# Patient Record
Sex: MALE | Race: WHITE | NOT HISPANIC OR LATINO | Employment: FULL TIME | URBAN - METROPOLITAN AREA
[De-identification: names, ages, dates, MRNs, and addresses within clinical notes are randomized per-mention and may not be internally consistent; named-entity substitution may affect disease eponyms.]

---

## 2017-01-25 ENCOUNTER — OFFICE VISIT (OUTPATIENT)
Dept: URGENT CARE | Facility: PHYSICIAN GROUP | Age: 27
End: 2017-01-25
Payer: COMMERCIAL

## 2017-01-25 VITALS
OXYGEN SATURATION: 95 % | DIASTOLIC BLOOD PRESSURE: 80 MMHG | RESPIRATION RATE: 14 BRPM | HEART RATE: 84 BPM | SYSTOLIC BLOOD PRESSURE: 124 MMHG | HEIGHT: 73 IN | BODY MASS INDEX: 23.86 KG/M2 | TEMPERATURE: 97.7 F | WEIGHT: 180 LBS

## 2017-01-25 DIAGNOSIS — J06.9 URI WITH COUGH AND CONGESTION: ICD-10-CM

## 2017-01-25 DIAGNOSIS — J40 BRONCHITIS: ICD-10-CM

## 2017-01-25 PROCEDURE — 99204 OFFICE O/P NEW MOD 45 MIN: CPT | Performed by: NURSE PRACTITIONER

## 2017-01-25 RX ORDER — DEXTROAMPHETAMINE SACCHARATE, AMPHETAMINE ASPARTATE MONOHYDRATE, DEXTROAMPHETAMINE SULFATE AND AMPHETAMINE SULFATE 2.5; 2.5; 2.5; 2.5 MG/1; MG/1; MG/1; MG/1
10 CAPSULE, EXTENDED RELEASE ORAL EVERY MORNING
COMMUNITY
End: 2017-10-17

## 2017-01-25 RX ORDER — AZITHROMYCIN 250 MG/1
TABLET, FILM COATED ORAL
Qty: 6 TAB | Refills: 0 | Status: SHIPPED | OUTPATIENT
Start: 2017-01-25 | End: 2017-10-17

## 2017-01-25 ASSESSMENT — ENCOUNTER SYMPTOMS
FEVER: 0
VOMITING: 0
WEAKNESS: 0
EYE REDNESS: 0
ABDOMINAL PAIN: 0
COUGH: 1
HEADACHES: 0
EYE DISCHARGE: 0
CONSTIPATION: 0
SHORTNESS OF BREATH: 0
CHILLS: 0
SPUTUM PRODUCTION: 1
DIARRHEA: 0
PALPITATIONS: 0
SORE THROAT: 0
NAUSEA: 0
MYALGIAS: 0
ORTHOPNEA: 0
WHEEZING: 0
BACK PAIN: 0

## 2017-01-25 NOTE — PROGRESS NOTES
"Subjective:      Tomas Pozo is a 26 y.o. male who presents with Cough            Cough  Pertinent negatives include no chest pain, chills, ear pain, eye redness, fever, headaches, myalgias, sore throat, shortness of breath or wheezing. There is no history of environmental allergies.   Tomas is a 26 year old male who is here for cough x 3 days. Moved here from [place] to work with [employer]. States \"everyone at work has pneumonia\" and was told by his boss to get looked at. Denies SOB, chest tightness, asthma. Taking Dayquil. Denies nasal congestion/facial pressure but has runny nose, denies sore throat. Drinks \"a gallon of water per day\". States has a yellow/green sputum with cough.    PMH:  has no past medical history on file.  MEDS:   Current outpatient prescriptions:   •  amphetamine-dextroamphetamine XR (ADDERALL XR) 10 MG CAPSULE SR 24 HR, Take 10 mg by mouth every morning., Disp: , Rfl:   •  azithromycin (ZITHROMAX) 250 MG Tab, Take 2 tabs by mouth on day 1, then take 1 tab on days 2-5, Disp: 6 Tab, Rfl: 0  ALLERGIES: No Known Allergies  SURGHX: History reviewed. No pertinent past surgical history.  SOCHX:    FH: Family history was reviewed, no pertinent findings to report      Review of Systems   Constitutional: Positive for malaise/fatigue. Negative for fever and chills.   HENT: Positive for congestion. Negative for ear pain and sore throat.    Eyes: Negative for discharge and redness.   Respiratory: Positive for cough and sputum production. Negative for shortness of breath and wheezing.    Cardiovascular: Negative for chest pain, palpitations and orthopnea.   Gastrointestinal: Negative for nausea, vomiting, abdominal pain, diarrhea and constipation.   Musculoskeletal: Negative for myalgias and back pain.   Neurological: Negative for weakness and headaches.   Endo/Heme/Allergies: Negative for environmental allergies.          Objective:     /80 mmHg  Pulse 84  Temp(Src) 36.5 °C (97.7 °F)  Resp 14  " "Ht 1.854 m (6' 1\")  Wt 81.647 kg (180 lb)  BMI 23.75 kg/m2  SpO2 95%     Physical Exam   Constitutional: He is oriented to person, place, and time. He appears well-developed and well-nourished. No distress.   HENT:   Head: Normocephalic.   Right Ear: Hearing, external ear and ear canal normal. Tympanic membrane is bulging. A middle ear effusion is present.   Left Ear: Hearing, external ear and ear canal normal. Tympanic membrane is bulging. A middle ear effusion is present.   Nose: Mucosal edema and rhinorrhea present. No sinus tenderness.   Mouth/Throat: Uvula is midline, oropharynx is clear and moist and mucous membranes are normal.   Eyes: Conjunctivae and EOM are normal. Pupils are equal, round, and reactive to light.   Neck: Normal range of motion. Neck supple.   Cardiovascular: Normal rate and regular rhythm.    Pulmonary/Chest: Effort normal. No accessory muscle usage. No respiratory distress. He has no decreased breath sounds. He has no wheezes. He has no rhonchi. He has no rales.   Musculoskeletal: Normal range of motion.   Lymphadenopathy:     He has no cervical adenopathy.   Neurological: He is alert and oriented to person, place, and time.   Skin: Skin is warm and dry. He is not diaphoretic.   Vitals reviewed.              Assessment/Plan:     1. URI with cough and congestion    - azithromycin (ZITHROMAX) 250 MG Tab; Take 2 tabs by mouth on day 1, then take 1 tab on days 2-5  Dispense: 6 Tab; Refill: 0    2. Bronchitis    - azithromycin (ZITHROMAX) 250 MG Tab; Take 2 tabs by mouth on day 1, then take 1 tab on days 2-5  Dispense: 6 Tab; Refill: 0      Increase water intake  May use Ibuprofen/Tylenol prn for any fever, body aches or throat pain  May take long acting antihistamine for seasonal allergy symptoms prn  May use saline nasal spray/navneet pot for nasal congestion prn  May use Nasacort prn for nasal congestion  May use throat lozenges for throat discomfort  May gargle with salt water prn for " throat discomfort  May drink smoothies for nutrition if too painful to swallow solid foods  Monitor for continued fever with treatment, any sinus pain/pressure with sinus congestion with thick mucus production and HA- need re-evaluation  Monitor for productive cough, SOB and chest pain/tightness, fever- need re-evaluation

## 2017-01-25 NOTE — MR AVS SNAPSHOT
"        Tomas Lemak   2017 2:45 PM   Office Visit   MRN: [id number]    Department:  [facility]   Dept Phone:  [phone]    Description:  Male : 1990   Provider:  LEENA Rush           Reason for Visit     Cough Cough, x 3 days.      Allergies as of 2017     No Known Allergies      You were diagnosed with     Bronchitis   [525024]         Vital Signs     Blood Pressure Pulse Temperature Respirations Height Weight    124/80 mmHg 84 36.5 °C (97.7 °F) 14 1.854 m (6' 1\") 81.647 kg (180 lb)    Body Mass Index Oxygen Saturation                23.75 kg/m2 95%          Basic Information     Date Of Birth Sex Race Ethnicity Preferred Language    1990 Male White Non- English      Health Maintenance     Patient has no pending health maintenance at this time      Current Immunizations     No immunizations on file.      Below and/or attached are the medications your provider expects you to take. Review all of your home medications and newly ordered medications with your provider and/or pharmacist. Follow medication instructions as directed by your provider and/or pharmacist. Please keep your medication list with you and share with your provider. Update the information when medications are discontinued, doses are changed, or new medications (including over-the-counter products) are added; and carry medication information at all times in the event of emergency situations     Allergies:  No Known Allergies          Medications  Valid as of: 2017 -  3:19 PM    Generic Name Brand Name Tablet Size Instructions for use    Amphetamine-Dextroamphetamine (CAPSULE SR 24 HR) ADDERALL XR 10 MG Take 10 mg by mouth every morning.        Azithromycin (Tab) ZITHROMAX 250 MG Take 2 tabs by mouth on day 1, then take 1 tab on days 2-5        .                 Medicines prescribed today were sent to:     [facility] - [place], [place] - [address]    [address] [place] [address]  "    Phone: [phone] Fax: [phone]    Open 24 Hours?: No      Medication refill instructions:       If your prescription bottle indicates you have medication refills left, it is not necessary to call your provider’s office. Please contact your pharmacy and they will refill your medication.    If your prescription bottle indicates you do not have any refills left, you may request refills at any time through one of the following ways: The online [facility]vd system (except Urgent Care), by calling your provider’s office, or by asking your pharmacy to contact your provider’s office with a refill request. Medication refills are processed only during regular business hours and may not be available until the next business day. Your provider may request additional information or to have a follow-up visit with you prior to refilling your medication.   *Please Note: Medication refills are assigned a new Rx number when refilled electronically. Your pharmacy may indicate that no refills were authorized even though a new prescription for the same medication is available at the pharmacy. Please request the medicine by name with the pharmacy before contacting your provider for a refill.           CrushBlvd Access Code: [email or web address]  Expires: 2/24/2017  3:19 PM    Your email address is not on file at [facility].  Email Addresses are required for you to sign up for [facility], please contact [phone] to verify your personal information and to provide your email address prior to attempting to register for [facility].    Tomas Pozo  [address] [place] [address]    [facility]ushBlvd  A secure, online tool to manage your health information     [facility]’s CrushBlvd® is a secure, online tool that connects you to your personalized health information from the privacy of your home -- day or night - making it very easy for you to manage your healthcare. Once the activation process is completed, you can even access your medical  information using the MeritBuilder andrzej, which is available for free in the Apple Andrzej store or Google Play store.     To learn more about MeritBuilder, visit [email or web address]    There are two levels of access available (as shown below):   My Chart Features  [facility] Doctor [facility]  Specialists [facility]  Urgent  Care Non-[facility] Primary Care Doctor   Email your healthcare team securely and privately 24/7 X X X    Manage appointments: schedule your next appointment; view details of past/upcoming appointments X      Request prescription refills. X      View recent personal medical records, including lab and immunizations X X X X   View health record, including health history, allergies, medications X X X X   Read reports about your outpatient visits, procedures, consult and ER notes X X X X   See your discharge summary, which is a recap of your hospital and/or ER visit that includes your diagnosis, lab results, and care plan X X  X     How to register for MeritBuilder:  Once your e-mail address has been verified, follow the following steps to sign up for MeritBuilder.     1. Go to  [email or web address]  2. Click on the Sign Up Now box, which takes you to the New Member Sign Up page. You will need to provide the following information:  a. Enter your MeritBuilder Access Code exactly as it appears at the top of this page. (You will not need to use this code after you’ve completed the sign-up process. If you do not sign up before the expiration date, you must request a new code.)   b. Enter your date of birth.   c. Enter your home email address.   d. Click Submit, and follow the next screen’s instructions.  3. Create a MeritBuilder ID. This will be your MeritBuilder login ID and cannot be changed, so think of one that is secure and easy to remember.  4. Create a MeritBuilder password. You can change your password at any time.  5. Enter your Password Reset Question and Answer. This can be used at a later time if you forget your password.    6. Enter your e-mail address. This allows you to receive e-mail notifications when new information is available in [employer].  7. Click Sign Up. You can now view your health information.    For assistance activating your [employer] account, call [phone]

## 2017-02-08 ENCOUNTER — OFFICE VISIT (OUTPATIENT)
Dept: MEDICAL GROUP | Facility: PHYSICIAN GROUP | Age: 27
End: 2017-02-08
Payer: COMMERCIAL

## 2017-02-08 VITALS
SYSTOLIC BLOOD PRESSURE: 110 MMHG | TEMPERATURE: 97.9 F | HEIGHT: 73 IN | OXYGEN SATURATION: 97 % | WEIGHT: 183 LBS | BODY MASS INDEX: 24.25 KG/M2 | HEART RATE: 93 BPM | DIASTOLIC BLOOD PRESSURE: 78 MMHG | RESPIRATION RATE: 14 BRPM

## 2017-02-08 DIAGNOSIS — F90.0 ATTENTION DEFICIT HYPERACTIVITY DISORDER (ADHD), PREDOMINANTLY INATTENTIVE TYPE: ICD-10-CM

## 2017-02-08 DIAGNOSIS — F51.01 PRIMARY INSOMNIA: ICD-10-CM

## 2017-02-08 DIAGNOSIS — F13.20 SEDATIVE, HYPNOTIC OR ANXIOLYTIC DEPENDENCE, UNSPECIFIED: ICD-10-CM

## 2017-02-08 DIAGNOSIS — R07.9 CHEST PAIN AT REST: ICD-10-CM

## 2017-02-08 PROCEDURE — 99204 OFFICE O/P NEW MOD 45 MIN: CPT | Performed by: NURSE PRACTITIONER

## 2017-02-08 RX ORDER — DEXTROAMPHETAMINE SACCHARATE, AMPHETAMINE ASPARTATE, DEXTROAMPHETAMINE SULFATE AND AMPHETAMINE SULFATE 5; 5; 5; 5 MG/1; MG/1; MG/1; MG/1
20 TABLET ORAL 2 TIMES DAILY
Qty: 60 TAB | Refills: 0 | Status: SHIPPED | OUTPATIENT
Start: 2017-02-08 | End: 2017-10-17

## 2017-02-08 RX ORDER — DEXTROAMPHETAMINE SACCHARATE, AMPHETAMINE ASPARTATE, DEXTROAMPHETAMINE SULFATE AND AMPHETAMINE SULFATE 5; 5; 5; 5 MG/1; MG/1; MG/1; MG/1
20 TABLET ORAL 2 TIMES DAILY
COMMUNITY
End: 2017-02-08 | Stop reason: SDUPTHER

## 2017-02-08 RX ORDER — ZOLPIDEM TARTRATE 5 MG/1
5 TABLET ORAL NIGHTLY PRN
COMMUNITY
End: 2017-02-08 | Stop reason: SDUPTHER

## 2017-02-08 RX ORDER — ZOLPIDEM TARTRATE 5 MG/1
5 TABLET ORAL NIGHTLY PRN
Qty: 30 TAB | Refills: 0 | Status: SHIPPED | OUTPATIENT
Start: 2017-02-08 | End: 2017-04-25 | Stop reason: SDUPTHER

## 2017-02-08 RX ORDER — DEXTROAMPHETAMINE SACCHARATE, AMPHETAMINE ASPARTATE, DEXTROAMPHETAMINE SULFATE AND AMPHETAMINE SULFATE 5; 5; 5; 5 MG/1; MG/1; MG/1; MG/1
20 TABLET ORAL 2 TIMES DAILY
Qty: 60 TAB | Refills: 0 | Status: SHIPPED | OUTPATIENT
Start: 2017-02-08 | End: 2017-02-08 | Stop reason: SDUPTHER

## 2017-02-08 ASSESSMENT — PATIENT HEALTH QUESTIONNAIRE - PHQ9: CLINICAL INTERPRETATION OF PHQ2 SCORE: 0

## 2017-02-08 NOTE — Clinical Note
February 8, 2017     Tomas Pozo  [address] [facility] [address]      Dear Tomas:    Thank you for enrolling in [employer] [place]. Please follow the instructions below to securely access your online medical record. [facility] allows you to send messages to your healthcare team, view certain test results, renew your prescriptions, schedule appointments, and more.     How Do I Sign Up?  1. In your Internet browser, go to  [email or web address]  2. Click on the Sign Up Now link in the Sign In box. You will see the New Member Sign Up page.  3. Enter your [employer] [place] Access Code exactly as it appears below (case sensitive). You will not need to use this code after you’ve completed the sign-up process. If you do not sign up before the expiration date, you must request a new code.  [employer] [place] Access Code: [email or web address]  Expires: 2/24/2017  3:19 PM    4. Enter your Email address and Date of Birth (mm/dd/yyyy) as indicated and click Submit. You will be taken to the next sign-up page.  5. Create a [employer] [place] ID (case sensitive) . This will be your [employer] [place] login ID and cannot be changed, so think of one that is secure and easy to remember.  6. Create a [employer] [place] password  (case sensitive).   · Your password must be a length of at least 6 characters/digits.  · It must include at least 1 numeric.  · You can change your password at any time.  7. Enter your Password Reset Question and Answer. This can be used at a later time if you forget your password.   8. Enter your e-mail address. You will receive e-mail notification when new information is available in [employer] [place].  9. Click Sign Up. You can now view your medical record.     Additional Information  Please contact [employer] [place] Customer Support at [phone] for any questions . Remember, [facility] [place] is NOT to be used for urgent needs. For medical emergencies, dial 911.          Introducing [employer] [place]    [facility]’s Secure, Online Health Connection      [facility] now offers  convenient, online access to your healthcare team and personal health information.  [employer] makes managing your healthcare easier than ever, and allows you to:  • Email your healthcare provider securely and privately  • Access your test results  • Request prescription refills 24 hours a day  • View your personal medical records from home  • Schedule or change your appointments  • View your Insurance and Billing Information  • Pay bills online ? Coming soon!        Sign below to get started:  I hereby request access to Worklight application.  I agree to abide by the [employer] Terms and Conditions, which will be provided to me upon activating my account.       __________________________________        _________________________  Name (Please Print)          Date of Birth     __________________________________       _________________________  Signature          Primary Care Provider      _______________  Date                          *For Internal Use Only: Please scan this form into the patient’s chart. Click on  - Select Patient - Attach to Encounter:  - Document Type: Consent   - Document Description: MyChart Consent

## 2017-02-08 NOTE — PROGRESS NOTES
Tomas Pozo is a 26 y.o. 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  [place] [facility].  Just moved here from [place].  Loves to snowboard  Attention deficit hyperactivity disorder (ADHD), predominantly inattentive type  Patient has been taking Adderall 10-20 mg daily since college. States his symptoms are mostly inattentive. Patient states that he takes between 10-15 mg in the morning and perhaps 5-10 mg at night as needed. States that he usually does not take the albuterol on weekends. Patient recently moved here from [place] and is an  at [employer]. Discussed urine drug screen and contract for continuous use of Adderall..    Chest pain at rest  Patient states that when he was in college he noticed his first episode of chest pain after lifting heavy weights. He then has noticed over the past ensuing years occasional episodes of chest pain with occasional palpitations. Patient states that this may be due to a prior stressful job in [place]. Has not noticed this chest pain occurring frequently since he moved to [place] and change [email or web address]. Denies shortness of breath diaphoresis edema, radiating pain into arm or neck. Patient states that these episodes resolve within 15 minutes    Primary insomnia  Patient states that he has been taking Ambien when necessary for the last 2-3 years. States that he may take one Ambien max 2 nights per week. Patient states that his work often keeps him up late and then he has difficulty the following night getting to sleep early enough. States that if he doesn't get four   hours of sleep he notices some grogginess with the Ambien. Denies unusual dreams or sleepwalking..      Current medicines (including changes today)  Current Outpatient Prescriptions   Medication Sig Dispense Refill   • zolpidem (AMBIEN) 5 MG Tab Take 1 Tab by mouth at bedtime as needed for Sleep. 30 Tab 0   •  "amphetamine-dextroamphetamine (ADDERALL, 20MG,) 20 MG Tab Take 1 Tab by mouth 2 times a day. 60 Tab 0   • amphetamine-dextroamphetamine XR (ADDERALL XR) 10 MG CAPSULE SR 24 HR Take 10 mg by mouth every morning.     • azithromycin (ZITHROMAX) 250 MG Tab Take 2 tabs by mouth on day 1, then take 1 tab on days 2-5 6 Tab 0     No current facility-administered medications for this visit.       He  has no past medical history on file.    He  has past surgical history that includes thyroglossal duct excision and open reduction.    Social History   Substance Use Topics   • Smoking status: Former Smoker -- 0.25 packs/day     Types: Cigarettes     Quit date: 02/08/2013   • Smokeless tobacco: Former User     Quit date: 02/08/2014   • Alcohol Use: 1.8 oz/week     3 Cans of beer per week      Comment: weekends       Social History     Social History Narrative       Family History   Problem Relation Age of Onset   • No Known Problems Mother    • Hypertension Father    • Psychiatry Sister        Family Status   Relation Status Death Age   • Mother Alive    • Father Alive    • Sister Alive    • Brother Alive    • Sister Alive          ROS  As stated in HPI.  All other systems reviewed and are negative     Objective:     Blood pressure 110/78, pulse 93, temperature 36.6 °C (97.9 °F), resp. rate 14, height 1.854 m (6' 1\"), weight 83.008 kg (183 lb), SpO2 97 %. Body mass index is 24.15 kg/(m^2).  Physical Exam:    Constitutional: Alert, no distress.  Skin: Warm, dry, good turgor, no rashes in visible areas.  Eye: Equal, round and reactive, conjunctiva clear, lids normal.  ENMT: Lips without lesions, good dentition, oropharynx clear.  Neck: Trachea midline, no masses, no thyromegaly. No cervical or supraclavicular lymphadenopathy.  Respiratory: Unlabored respiratory effort, lungs clear to auscultation, no wheezes, no ronchi.  Cardiovascular: Normal S1, S2, no murmur, no edema.  Abdomen: Soft, non-tender, no masses, no " hepatosplenomegaly.  Psych: Alert and oriented x3, normal affect and mood.        Assessment and Plan:   The following treatment plan was discussed    1. Chest pain at rest  This is a new problem to me. Chronic. Stable. Patient will keep a journal of symptoms and frequency of his chest pain. We discussed the possibility that this may be a side effect that is occurring from the Adderall. We will draw labs to rule out metabolic issues. We'll monitor and follow.  - COMP METABOLIC PANEL; Future  - LIPID PROFILE; Future    2. Attention deficit hyperactivity disorder (ADHD), predominantly inattentive type  This is a new problem to me. Patient has been on Adderall 20 mg for several years. Blood pressure 110/78 today. Pulse 93. Denies chest pain at this time. Discussed controlled substance agreement. Patient reviewed and signed agreement and we did the urine drug screen today. We'll monitor and follow results.  - Controlled Substance Treatment Agreement    3. Primary insomnia  This is a new problem to me. Chronic. Stable. Patient to continue with Ambien 5 mg tablets when necessary. Discussed sleep hygiene. We'll monitor and follow.      Records requested.  Followup: No Follow-up on file.

## 2017-02-08 NOTE — ASSESSMENT & PLAN NOTE
Patient has been taking Adderall 10-20 mg daily since college. States his symptoms are mostly inattentive. Patient states that he takes between 10-15 mg in the morning and perhaps 5-10 mg at night as needed. States that he usually does not take the albuterol on weekends. Patient recently moved here from [place] and is an  at [employer]. Discussed urine drug screen and contract for continuous use of Adderall..

## 2017-02-08 NOTE — ASSESSMENT & PLAN NOTE
Patient states that when he was in college he noticed his first episode of chest pain after lifting heavy weights. He then has noticed over the past ensuing years occasional episodes of chest pain with occasional palpitations. Patient states that this may be due to a prior stressful job in [place]. Has not noticed this chest pain occurring frequently since he moved to [place] and change drops. Denies shortness of breath diaphoresis edema, radiating pain into arm or neck. Patient states that these episodes resolve within 15 minutes

## 2017-02-08 NOTE — ASSESSMENT & PLAN NOTE
Patient states that he has been taking Ambien when necessary for the last 2-3 years. States that he may take one Ambien max 2 nights per week. Patient states that his work often keeps him up late and then he has difficulty the following night getting to sleep early enough. States that if he doesn't get four   hours of sleep he notices some grogginess with the Ambien. Denies unusual dreams or sleepwalking..

## 2017-04-25 RX ORDER — ZOLPIDEM TARTRATE 5 MG/1
5 TABLET ORAL NIGHTLY PRN
Qty: 30 TAB | Refills: 0 | Status: SHIPPED
Start: 2017-04-25 | End: 2017-06-23 | Stop reason: SDUPTHER

## 2017-04-25 NOTE — TELEPHONE ENCOUNTER
Was the patient seen in the last year in this department? Yes     Does patient have an active prescription for medications requested? No     Received Request Via: Pharmacy

## 2017-05-04 ENCOUNTER — OFFICE VISIT (OUTPATIENT)
Dept: MEDICAL GROUP | Facility: PHYSICIAN GROUP | Age: 27
End: 2017-05-04
Payer: COMMERCIAL

## 2017-05-04 VITALS
RESPIRATION RATE: 14 BRPM | WEIGHT: 181 LBS | BODY MASS INDEX: 23.99 KG/M2 | DIASTOLIC BLOOD PRESSURE: 80 MMHG | OXYGEN SATURATION: 93 % | SYSTOLIC BLOOD PRESSURE: 114 MMHG | HEIGHT: 73 IN | TEMPERATURE: 97.9 F | HEART RATE: 74 BPM

## 2017-05-04 DIAGNOSIS — F90.0 ATTENTION DEFICIT HYPERACTIVITY DISORDER (ADHD), PREDOMINANTLY INATTENTIVE TYPE: ICD-10-CM

## 2017-05-04 DIAGNOSIS — F51.01 PRIMARY INSOMNIA: ICD-10-CM

## 2017-05-04 DIAGNOSIS — R09.89 CHEST CONGESTION: ICD-10-CM

## 2017-05-04 DIAGNOSIS — R07.9 CHEST PAIN AT REST: ICD-10-CM

## 2017-05-04 PROCEDURE — 99213 OFFICE O/P EST LOW 20 MIN: CPT | Performed by: NURSE PRACTITIONER

## 2017-05-04 NOTE — ASSESSMENT & PLAN NOTE
Patient describes the onset of cold and chest congestion 5 days ago. He states that he had aches and pains along with chest tightness and congestion with ensuing cough. He states he has been resting taking fluids taking ibuprofen and one after 7 with some improvement of his symptoms. States that yesterday was the first day he did not have a low-grade fever. Patient denies any chest pain, bloody cough facial pain or sinus pressure.

## 2017-05-04 NOTE — MR AVS SNAPSHOT
"        Tomas Nohelia   2017 7:20 AM   Office Visit   MRN: [id number]    Department:  [facility]   Dept Phone:  [phone]    Description:  Male : 1990   Provider:  JESSE Lockett           Reason for Visit     Cold Symptoms x's 1 week       Allergies as of 2017     No Known Allergies      Vital Signs     Blood Pressure Pulse Temperature Respirations Height Weight    114/80 mmHg 74 36.6 °C (97.9 °F) 14 1.854 m (6' 0.99\") 82.101 kg (181 lb)    Body Mass Index Oxygen Saturation Smoking Status             23.89 kg/m2 93% Former Smoker         Basic Information     Date Of Birth Sex Race Ethnicity Preferred Language    1990 Male White Non- English      Problem List              ICD-10-CM Priority Class Noted - Resolved    Chest pain at rest R07.9   2017 - Present    Attention deficit hyperactivity disorder (ADHD), predominantly inattentive type F90.0   2017 - Present    Primary insomnia F51.01   2017 - Present      Health Maintenance        Date Due Completion Dates    IMM HEP B VACCINE (1 of 3 - Primary Series) 1989 ---    IMM HEP A VACCINE (1 of 2 - Standard Series) 1990 ---    IMM VARICELLA (CHICKENPOX) VACCINE (1 of 2 - 2 Dose Adolescent Series) 2002 ---    IMM DTaP/Tdap/Td Vaccine (1 - Tdap) 2008 ---            Current Immunizations     No immunizations on file.      Below and/or attached are the medications your provider expects you to take. Review all of your home medications and newly ordered medications with your provider and/or pharmacist. Follow medication instructions as directed by your provider and/or pharmacist. Please keep your medication list with you and share with your provider. Update the information when medications are discontinued, doses are changed, or new medications (including over-the-counter products) are added; and carry medication information at all times in the event of emergency situations     Allergies:  No Known " Allergies          Medications  Valid as of: May 04, 2017 -  7:47 AM    Generic Name Brand Name Tablet Size Instructions for use    Amphetamine-Dextroamphetamine (CAPSULE SR 24 HR) ADDERALL XR 10 MG Take 10 mg by mouth every morning.        Amphetamine-Dextroamphetamine (Tab) ADDERALL 20 MG Take 1 Tab by mouth 2 times a day.        Azithromycin (Tab) ZITHROMAX 250 MG Take 2 tabs by mouth on day 1, then take 1 tab on days 2-5        Zolpidem Tartrate (Tab) AMBIEN 5 MG Take 1 Tab by mouth at bedtime as needed for Sleep.        .                 Medicines prescribed today were sent to:     [facility] - [place], [place] - [address]    [address] [place] [address]    Phone: [phone] Fax: [phone]    Open 24 Hours?: No      Medication refill instructions:       If your prescription bottle indicates you have medication refills left, it is not necessary to call your provider’s office. Please contact your pharmacy and they will refill your medication.    If your prescription bottle indicates you do not have any refills left, you may request refills at any time through one of the following ways: The online [employer] system (except Urgent Care), by calling your provider’s office, or by asking your pharmacy to contact your provider’s office with a refill request. Medication refills are processed only during regular business hours and may not be available until the next business day. Your provider may request additional information or to have a follow-up visit with you prior to refilling your medication.   *Please Note: Medication refills are assigned a new Rx number when refilled electronically. Your pharmacy may indicate that no refills were authorized even though a new prescription for the same medication is available at the pharmacy. Please request the medicine by name with the pharmacy before contacting your provider for a refill.           [employer] Access Code: [email or web address]  Expires: 6/3/2017  7:47  AM    [employer]  A secure, online tool to manage your health information     [employer]® is a secure, online tool that connects you to your personalized health information from the privacy of your home -- day or night - making it very easy for you to manage your healthcare. Once the activation process is completed, you can even access your medical information using the [employer] andrzej, which is available for free in the Apple Andrzej store or Google Play store.     [employer] provides the following levels of access (as shown below):   My Chart Features   [facility] Primary [facility] Doctor [facility]  Specialists [facility]  Urgent  Care [facility]  Primary Care  Doctor   Email your healthcare team securely and privately 24/7 X X X    Manage appointments: schedule your next appointment; view details of past/upcoming appointments X      Request prescription refills. X      View recent personal medical records, including lab and immunizations X X X X   View health record, including health history, allergies, medications X X X X   Read reports about your outpatient visits, procedures, consult and ER notes X X X X   See your discharge summary, which is a recap of your hospital and/or ER visit that includes your diagnosis, lab results, and care plan. X X       How to register for [employer]:  1. Go to  [email or web address].  2. Click on the Sign Up Now box, which takes you to the New Member Sign Up page. You will need to provide the following information:  a. Enter your [employer] Access Code exactly as it appears at the top of this page. (You will not need to use this code after you’ve completed the sign-up process. If you do not sign up before the expiration date, you must request a new code.)   b. Enter your date of birth.   c. Enter your home email address.   d. Click Submit, and follow the next screen’s instructions.  3. Create a [employer] ID. This will be your [employer] login ID and cannot be changed, so think of one that is secure and  easy to remember.  4. Create a [employer] password. You can change your password at any time.  5. Enter your Password Reset Question and Answer. This can be used at a later time if you forget your password.   6. Enter your e-mail address. This allows you to receive e-mail notifications when new information is available in [employer].  7. Click Sign Up. You can now view your health information.    For assistance activating your [employer] account, call [phone]

## 2017-05-04 NOTE — PROGRESS NOTES
Chief Complaint   Patient presents with   • Cold Symptoms     x's 1 week        Subjective:   Tomas Pozo is a 26 y.o. male here today for evaluation and management of:    Chest pain at rest  Patient states that he has not had any episodes of chest pain since we last visited 3 months ago. He feels that his job is more relaxed and he is enjoying it and learning. He also feels that the moved from [place] was scheduled for him and this could've been contributing to his chest pain.    Primary insomnia  Patient states that he is occasionally taking an Ambien maybe once a week. He is really trying to work on getting to bed earlier around 10:00 rather than midnight or 1:00 to try and improve his sleep hygiene.    Attention deficit hyperactivity disorder (ADHD), predominantly inattentive type  Patient states that his focus is very good on his current Adderall dosage. He does take it daily, on weekends he cuts it in half and finds that this is working for him. Denies palpitations or headaches. Weight is stable. He is exercising daily.    Chest congestion  Patient describes the onset of cold and chest congestion 5 days ago. He states that he had aches and pains along with chest tightness and congestion with ensuing cough. He states he has been resting taking fluids taking ibuprofen and one after 7 with some improvement of his symptoms. States that yesterday was the first day he did not have a low-grade fever. Patient denies any chest pain, bloody cough facial pain or sinus pressure.         Current medicines (including changes today)  Current Outpatient Prescriptions   Medication Sig Dispense Refill   • amphetamine-dextroamphetamine (ADDERALL, 20MG,) 20 MG Tab Take 1 Tab by mouth 2 times a day. 60 Tab 0   • zolpidem (AMBIEN) 5 MG Tab Take 1 Tab by mouth at bedtime as needed for Sleep. 30 Tab 0   • amphetamine-dextroamphetamine XR (ADDERALL XR) 10 MG CAPSULE SR 24 HR Take 10 mg by mouth every morning.     • azithromycin  "(ZITHROMAX) 250 MG Tab Take 2 tabs by mouth on day 1, then take 1 tab on days 2-5 6 Tab 0     No current facility-administered medications for this visit.     He  has no past medical history on file.    ROS as stated in history of present illness.  No chest pain, no shortness of breath, no abdominal pain       Objective:     Blood pressure 114/80, pulse 74, temperature 36.6 °C (97.9 °F), resp. rate 14, height 1.854 m (6' 0.99\"), weight 82.101 kg (181 lb), SpO2 93 %. Body mass index is 23.89 kg/(m^2). stable.  Physical Exam:  Constitutional: Alert, no distress.  Skin: Warm, dry, good turgor,no cyanosis, no rashes in visible areas.  Eye: Equal, round and reactive, conjunctiva clear, lids normal.  Ears: No tenderness, no discharge.  External canals are without any significant edema or erythema.  Tympanic membranes are without any inflammation, no effusion.  Gross auditory acuity is intact.  Nose: symmetrical without tenderness, no discharge. Slight erythema noted in the right nares, clear discharge.  Mouth/Throat: lips without lesion.  Oropharynx clear.  Throat with mild erythema, no exudates or tonsillar enlargement.  Neck: Trachea midline, no masses, no obvious thyroid enlargement.. No cervical or supraclavicular lymphadenopathy. Range of motion within normal limits.  Neuro: Cranial nerves 2-12 grossly intact.  No sensory deficit.  Respiratory: Unlabored respiratory effort, lungs clear to auscultation, no wheezes, no ronchi.  Cardiovascular: Normal S1, S2, no murmur, no edema..  Psych: Alert and oriented x3, normal affect and mood and judgement.        Assessment and Plan:   The following treatment plan was discussed    1. Chest pain at rest  Chronic. Stable. Patient states that this has resolved at this time.    2. Primary insomnia  Chronic. Ongoing. Patient to continue working on sleep hygiene. We discussed caffeine intake and timing of exercise. Suggested the patient tries not to exercise right before going to " bed. Continue with Ambien when necessary.    3. Attention deficit hyperactivity disorder (ADHD), predominantly inattentive type  Chronic. Stable. Continue with Adderall. Controlled substance agreement is in place. No evidence of abuse.    4. Chest congestion  This is a new problem to me. Acute. Improving. Continue with over-the-counter guameffisin, may add Delsym cough syrup at night. Over-the-counter antihistamine or decongestant as needed for symptom control. Ibuprofen 4 aches and pains. Patient to return if symptoms do not improve in the next week or he develops recurrence of fever or chills chest pain or productive cough.  PLEASE NOTE: This dictation was created using voice recognition software. I have made every reasonable attempt to correct obvious errors, but I expect that there are errors of grammar and possibly content that I did not discover before finalizing the note.      Followup: Return if symptoms worsen or fail to improve.

## 2017-05-04 NOTE — ASSESSMENT & PLAN NOTE
Patient states that he has not had any episodes of chest pain since we last visited 3 months ago. He feels that his job is more relaxed and he is enjoying it and learning. He also feels that the moved from [place] was scheduled for him and this could've been contributing to his chest pain.

## 2017-05-04 NOTE — ASSESSMENT & PLAN NOTE
Patient states that he is occasionally taking an Ambien maybe once a week. He is really trying to work on getting to bed earlier around 10:00 rather than midnight or 1:00 to try and improve his sleep hygiene.

## 2017-05-04 NOTE — Clinical Note
May 4, 2017     Tomas Pozo  [address] [address]      Dear Tomas:    Thank you for enrolling in [employer]. Please follow the instructions below to securely access your online medical record. [employer] allows you to send messages to your healthcare team, view certain test results, renew your prescriptions, schedule appointments, and more.     How Do I Sign Up?  1. In your Internet browser, go to  [email or web address]  2. Click on the Sign Up Now link in the Sign In box. You will see the New Member Sign Up page.  3. Enter your [employer] Access Code exactly as it appears below (case sensitive). You will not need to use this code after you’ve completed the sign-up process. If you do not sign up before the expiration date, you must request a new code.  [employer] Access Code: [email or web address]  Expires: 6/3/2017  7:47 AM    4. Enter your Email address and Date of Birth (mm/dd/yyyy) as indicated and click Submit. You will be taken to the next sign-up page.  5. Create a [employer] ID (case sensitive) . This will be your [employer] login ID and cannot be changed, so think of one that is secure and easy to remember.  6. Create a [employer] password  (case sensitive).   · Your password must be a length of at least 6 characters/digits.  · It must include at least 1 numeric.  · You can change your password at any time.  7. Enter your Password Reset Question and Answer. This can be used at a later time if you forget your password.   8. Enter your e-mail address. You will receive e-mail notification when new information is available in [employer].  9. Click Sign Up. You can now view your medical record.     Additional Information  Please contact [employer] Customer Support at [phone] for any questions . Remember, [employer] is NOT to be used for urgent needs. For medical emergencies, dial 911.          Introducing [employer]    [facility]’s Secure, Online Health Connection      [facility] now offers convenient,  online access to your healthcare team and personal health information.  [employer] makes managing your healthcare easier than ever, and allows you to:  • Email your healthcare provider securely and privately  • Access your test results  • Request prescription refills 24 hours a day  • View your personal medical records from home  • Schedule or change your appointments  • View your Insurance and Billing Information  • Pay bills online ? Coming soon!        Sign below to get started:  I hereby request access to fitaborate application.  I agree to abide by the [employer] Terms and Conditions, which will be provided to me upon activating my account.       __________________________________        _________________________  Name (Please Print)          Date of Birth     __________________________________       _________________________  Signature          Primary Care Provider      _______________  Date                          *For Internal Use Only: Please scan this form into the patient’s chart. Click on  - Select Patient - Attach to Encounter:  - Document Type: Consent   - Document Description: MyChart Consent

## 2017-05-04 NOTE — ASSESSMENT & PLAN NOTE
Patient states that his focus is very good on his current Adderall dosage. He does take it daily, on weekends he cuts it in half and finds that this is working for him. Denies palpitations or headaches. Weight is stable. He is exercising daily.

## 2017-06-23 RX ORDER — ZOLPIDEM TARTRATE 5 MG/1
5 TABLET ORAL NIGHTLY PRN
Qty: 30 TAB | Refills: 0 | Status: SHIPPED
Start: 2017-06-23 | End: 2017-08-17 | Stop reason: SDUPTHER

## 2017-08-17 RX ORDER — ZOLPIDEM TARTRATE 5 MG/1
5 TABLET ORAL NIGHTLY PRN
Qty: 30 TAB | Refills: 0 | Status: SHIPPED
Start: 2017-08-17 | End: 2017-10-17 | Stop reason: SDUPTHER

## 2017-08-17 NOTE — TELEPHONE ENCOUNTER
Requested Prescriptions     Signed Prescriptions Disp Refills   • zolpidem (AMBIEN) 5 MG Tab 30 Tab 0     Sig: Take 1 Tab by mouth at bedtime as needed for Sleep.     Authorizing Provider: TAMMY RAM A.P.R.N.

## 2017-10-17 ENCOUNTER — OFFICE VISIT (OUTPATIENT)
Dept: MEDICAL GROUP | Facility: PHYSICIAN GROUP | Age: 27
End: 2017-10-17
Payer: COMMERCIAL

## 2017-10-17 VITALS
TEMPERATURE: 98.1 F | HEIGHT: 73 IN | DIASTOLIC BLOOD PRESSURE: 78 MMHG | OXYGEN SATURATION: 95 % | SYSTOLIC BLOOD PRESSURE: 116 MMHG | HEART RATE: 82 BPM | BODY MASS INDEX: 24.65 KG/M2 | RESPIRATION RATE: 14 BRPM | WEIGHT: 186 LBS

## 2017-10-17 DIAGNOSIS — R07.9 CHEST PAIN AT REST: ICD-10-CM

## 2017-10-17 DIAGNOSIS — Z23 NEED FOR VACCINATION: ICD-10-CM

## 2017-10-17 DIAGNOSIS — F90.0 ATTENTION DEFICIT HYPERACTIVITY DISORDER (ADHD), PREDOMINANTLY INATTENTIVE TYPE: ICD-10-CM

## 2017-10-17 DIAGNOSIS — F51.01 PRIMARY INSOMNIA: ICD-10-CM

## 2017-10-17 PROCEDURE — 93000 ELECTROCARDIOGRAM COMPLETE: CPT | Performed by: NURSE PRACTITIONER

## 2017-10-17 PROCEDURE — 90471 IMMUNIZATION ADMIN: CPT | Performed by: NURSE PRACTITIONER

## 2017-10-17 PROCEDURE — 90686 IIV4 VACC NO PRSV 0.5 ML IM: CPT | Performed by: NURSE PRACTITIONER

## 2017-10-17 PROCEDURE — 99214 OFFICE O/P EST MOD 30 MIN: CPT | Mod: 25 | Performed by: NURSE PRACTITIONER

## 2017-10-17 RX ORDER — ZOLPIDEM TARTRATE 5 MG/1
5 TABLET ORAL NIGHTLY PRN
Qty: 30 TAB | Refills: 0 | Status: SHIPPED
Start: 2017-10-17 | End: 2018-01-09 | Stop reason: SDUPTHER

## 2017-10-17 NOTE — PROGRESS NOTES
"Chief Complaint   Patient presents with   • Annual Exam   • Flu Vaccine       Subjective:   Tomas Pozo is a 27 y.o. male here today for evaluation and management of:    Primary insomnia  Taking ambien once a week.    Chest pain at rest  Has been noticing increase chest pain. Stopped taking adderall.  Drinking 4 cups of coffee daily. Chest pain feels like he is being compressed.  Stopped caffiene Has history of palpitations but no pain in the past.  Has increased stress at work.     Attention deficit hyperactivity disorder (ADHD), predominantly inattentive type  Stopped taking Adderall due to not feeling like he wanted to take it anymore.            Current medicines (including changes today)  Current Outpatient Prescriptions   Medication Sig Dispense Refill   • zolpidem (AMBIEN) 5 MG Tab Take 1 Tab by mouth at bedtime as needed for Sleep. 30 Tab 0     No current facility-administered medications for this visit.      He  has a past medical history of History of palpitations.    ROS as stated in hpi   no shortness of breath, no abdominal pain       Objective:     Blood pressure 116/78, pulse 82, temperature 36.7 °C (98.1 °F), resp. rate 14, height 1.854 m (6' 1\"), weight 84.4 kg (186 lb), SpO2 95 %. Body mass index is 24.54 kg/m². stable.  Physical Exam:  Constitutional: Alert, no distress.  Skin: Warm, dry, good turgor,no cyanosis, no rashes in visible areas.  Eye: Equal, round and reactive, conjunctiva clear, lids normal.  Ears: No tenderness, no discharge.  External canals are without any significant edema or erythema.  Tympanic membranes are without any inflammation, no effusion.  Gross auditory acuity is intact.  Nose: symmetrical without tenderness, no discharge.  Mouth/Throat: lips without lesion.  Oropharynx clear.  Throat without erythema, exudates or tonsillar enlargement.  Neck: Trachea midline, no masses, no obvious thyroid enlargement.. No cervical or supraclavicular lymphadenopathy. Range of motion " within normal limits.  Neuro: Cranial nerves 2-12 grossly intact.  No sensory deficit.  Respiratory: Unlabored respiratory effort, lungs clear to auscultation, no wheezes, no ronchi.  Cardiovascular: Normal S1, S2, no murmur, no edema. EKG NSR without ectopy or ST elevation in office  EKG Interpretation    Interpreted by emergency department physician    Rhythm: normal sinus   Rate: 50-60  Axis: normal  Ectopy: none  Conduction: normal  ST Segments: no acute change  T Waves: normal  Q Waves: none    Clinical Impression: no acute changes      Abdomen: Soft, non-tender, no masses, no guarding,  no hepatosplenomegaly.  Psych: Alert and oriented x3, normal affect and mood and judgement.        Assessment and Plan:   The following treatment plan was discussed    1. Primary insomnia  Chronic.  Ongoing.  Ambien prn.  Refill provided today    2. Chest pain at rest  Chronic. Ongoing. EKG in the office shows normal sinus rhythm without any ectopy. No ST elevation. Patient would like a referral to cardiology just to rule out any cardiac cause to his chest pain. We discussed at length that this may be anxiety related as he is having overloads of stress at his job at [employer]. We discussed that if cardiology was negative we would consider an antidepressant that has good anxiety coverage. He agrees with this plan. Referral placed. Patient also to get labs drawn prior to seeing cardiology. Monitor and follow.  - REFERRAL TO CARDIOLOGY  - EKG - Clinic Performed  - CBC WITH DIFFERENTIAL; Future  - COMP METABOLIC PANEL; Future  - LIPID PROFILE; Future  - TSH; Future    3. Need for vaccination  Patient requesting flu vaccine today. No acute illness. Afebrile. Consent signed.  I have placed the below orders and discussed them with an approved delegating provider.  The MA is performing the below orders under the direction of Jun Davidson M.D.   .    - INFLUENZA VACCINE QUAD INJ >3Y(PF)    4. Attention deficit hyperactivity disorder (ADHD),  predominantly inattentive type  Chronic. Stable at this time. Patient stopped taking Adderall as he thought this may be contributing to his chest pain.      Followup: Return if symptoms worsen or fail to improve, for anxiety.

## 2017-10-17 NOTE — ASSESSMENT & PLAN NOTE
Has been noticing increase chest pain. Stopped taking adderall.  Drinking 4 cups of coffee daily. Chest pain feels like he is being compressed.  Stopped caffiene Has history of palpitations but no pain in the past.  Has increased stress at work.

## 2017-10-19 ENCOUNTER — HOSPITAL ENCOUNTER (OUTPATIENT)
Dept: LAB | Facility: MEDICAL CENTER | Age: 27
End: 2017-10-19
Attending: NURSE PRACTITIONER
Payer: COMMERCIAL

## 2017-10-19 DIAGNOSIS — R07.9 CHEST PAIN AT REST: ICD-10-CM

## 2017-10-19 LAB
ALBUMIN SERPL BCP-MCNC: 4.5 G/DL (ref 3.2–4.9)
ALBUMIN/GLOB SERPL: 1.6 G/DL
ALP SERPL-CCNC: 56 U/L (ref 30–99)
ALT SERPL-CCNC: 18 U/L (ref 2–50)
ANION GAP SERPL CALC-SCNC: 10 MMOL/L (ref 0–11.9)
AST SERPL-CCNC: 19 U/L (ref 12–45)
BASOPHILS # BLD AUTO: 1.1 % (ref 0–1.8)
BASOPHILS # BLD: 0.05 K/UL (ref 0–0.12)
BILIRUB SERPL-MCNC: 0.9 MG/DL (ref 0.1–1.5)
BUN SERPL-MCNC: 15 MG/DL (ref 8–22)
CALCIUM SERPL-MCNC: 9.9 MG/DL (ref 8.5–10.5)
CHLORIDE SERPL-SCNC: 103 MMOL/L (ref 96–112)
CHOLEST SERPL-MCNC: 148 MG/DL (ref 100–199)
CO2 SERPL-SCNC: 28 MMOL/L (ref 20–33)
CREAT SERPL-MCNC: 1.11 MG/DL (ref 0.5–1.4)
EOSINOPHIL # BLD AUTO: 0.17 K/UL (ref 0–0.51)
EOSINOPHIL NFR BLD: 3.9 % (ref 0–6.9)
ERYTHROCYTE [DISTWIDTH] IN BLOOD BY AUTOMATED COUNT: 38.2 FL (ref 35.9–50)
GFR SERPL CREATININE-BSD FRML MDRD: >60 ML/MIN/1.73 M 2
GLOBULIN SER CALC-MCNC: 2.8 G/DL (ref 1.9–3.5)
GLUCOSE SERPL-MCNC: 85 MG/DL (ref 65–99)
HCT VFR BLD AUTO: 46 % (ref 42–52)
HDLC SERPL-MCNC: 46 MG/DL
HGB BLD-MCNC: 15.8 G/DL (ref 14–18)
IMM GRANULOCYTES # BLD AUTO: 0 K/UL (ref 0–0.11)
IMM GRANULOCYTES NFR BLD AUTO: 0 % (ref 0–0.9)
LDLC SERPL CALC-MCNC: 83 MG/DL
LYMPHOCYTES # BLD AUTO: 1.96 K/UL (ref 1–4.8)
LYMPHOCYTES NFR BLD: 44.5 % (ref 22–41)
MCH RBC QN AUTO: 30.9 PG (ref 27–33)
MCHC RBC AUTO-ENTMCNC: 34.3 G/DL (ref 33.7–35.3)
MCV RBC AUTO: 89.8 FL (ref 81.4–97.8)
MONOCYTES # BLD AUTO: 0.52 K/UL (ref 0–0.85)
MONOCYTES NFR BLD AUTO: 11.8 % (ref 0–13.4)
NEUTROPHILS # BLD AUTO: 1.7 K/UL (ref 1.82–7.42)
NEUTROPHILS NFR BLD: 38.7 % (ref 44–72)
NRBC # BLD AUTO: 0 K/UL
NRBC BLD AUTO-RTO: 0 /100 WBC
PLATELET # BLD AUTO: 213 K/UL (ref 164–446)
PMV BLD AUTO: 10.1 FL (ref 9–12.9)
POTASSIUM SERPL-SCNC: 3.9 MMOL/L (ref 3.6–5.5)
PROT SERPL-MCNC: 7.3 G/DL (ref 6–8.2)
RBC # BLD AUTO: 5.12 M/UL (ref 4.7–6.1)
SODIUM SERPL-SCNC: 141 MMOL/L (ref 135–145)
TRIGL SERPL-MCNC: 95 MG/DL (ref 0–149)
TSH SERPL DL<=0.005 MIU/L-ACNC: 1.32 UIU/ML (ref 0.3–3.7)
WBC # BLD AUTO: 4.4 K/UL (ref 4.8–10.8)

## 2017-10-19 PROCEDURE — 80061 LIPID PANEL: CPT

## 2017-10-19 PROCEDURE — 85025 COMPLETE CBC W/AUTO DIFF WBC: CPT

## 2017-10-19 PROCEDURE — 80053 COMPREHEN METABOLIC PANEL: CPT

## 2017-10-19 PROCEDURE — 84443 ASSAY THYROID STIM HORMONE: CPT

## 2017-10-19 PROCEDURE — 36415 COLL VENOUS BLD VENIPUNCTURE: CPT

## 2017-11-01 ENCOUNTER — OFFICE VISIT (OUTPATIENT)
Dept: CARDIOLOGY | Facility: MEDICAL CENTER | Age: 27
End: 2017-11-01
Payer: COMMERCIAL

## 2017-11-01 VITALS
HEART RATE: 78 BPM | OXYGEN SATURATION: 98 % | DIASTOLIC BLOOD PRESSURE: 80 MMHG | HEIGHT: 72 IN | BODY MASS INDEX: 25.73 KG/M2 | WEIGHT: 190 LBS | SYSTOLIC BLOOD PRESSURE: 110 MMHG

## 2017-11-01 DIAGNOSIS — R07.89 OTHER CHEST PAIN: ICD-10-CM

## 2017-11-01 DIAGNOSIS — R00.2 PALPITATIONS: ICD-10-CM

## 2017-11-01 DIAGNOSIS — F90.0 ATTENTION DEFICIT HYPERACTIVITY DISORDER (ADHD), PREDOMINANTLY INATTENTIVE TYPE: ICD-10-CM

## 2017-11-01 PROCEDURE — 99204 OFFICE O/P NEW MOD 45 MIN: CPT | Performed by: INTERNAL MEDICINE

## 2017-11-01 RX ORDER — CHLORAL HYDRATE 500 MG
1000 CAPSULE ORAL
COMMUNITY
End: 2021-10-16

## 2017-11-01 ASSESSMENT — ENCOUNTER SYMPTOMS
DOUBLE VISION: 0
WEIGHT LOSS: 0
CLAUDICATION: 0
LOSS OF CONSCIOUSNESS: 0
PALPITATIONS: 1
EYE DISCHARGE: 0
CHILLS: 0
BRUISES/BLEEDS EASILY: 0
COUGH: 0
NAUSEA: 0
FALLS: 0
MYALGIAS: 0
VOMITING: 0
DIZZINESS: 0
BLOOD IN STOOL: 0
ABDOMINAL PAIN: 0
PND: 0
SENSORY CHANGE: 0
SPEECH CHANGE: 0
FEVER: 0
HALLUCINATIONS: 0
BLURRED VISION: 0
ORTHOPNEA: 0
HEADACHES: 0
SHORTNESS OF BREATH: 0
EYE PAIN: 0
DEPRESSION: 0

## 2017-11-01 NOTE — PROGRESS NOTES
Subjective:   Tomas Pozo is a 27 y.o. male who presents today for evaluation of palpitations. Palpitation is sporadic but has some association with Adderal and caffeine. Patient feels like his heart is being pulled down. No family history of sudden cardiac death. No presyncope or syncope associated with his palpitations.    No prior history of sudden cardiac death.    Past Medical History:   Diagnosis Date   • History of palpitations      Past Surgical History:   Procedure Laterality Date   • OPEN REDUCTION     • THYROGLOSSAL DUCT EXCISION       Family History   Problem Relation Age of Onset   • No Known Problems Mother    • Hypertension Father    • Psychiatry Sister      History   Smoking Status   • Former Smoker   • Packs/day: 0.25   • Types: Cigarettes   • Quit date: 2/8/2013   Smokeless Tobacco   • Former User   • Quit date: 2/8/2014     No Known Allergies  Outpatient Encounter Prescriptions as of 11/1/2017   Medication Sig Dispense Refill   • Multiple Vitamins-Minerals (MULTIVITAMIN PO) Take  by mouth.     • Omega-3 Fatty Acids (FISH OIL) 1000 MG Cap capsule Take 1,000 mg by mouth 3 times a day, with meals.     • zolpidem (AMBIEN) 5 MG Tab Take 1 Tab by mouth at bedtime as needed for Sleep. 30 Tab 0     No facility-administered encounter medications on file as of 11/1/2017.      Review of Systems   Constitutional: Negative for chills, fever, malaise/fatigue and weight loss.   HENT: Negative for ear discharge, ear pain, hearing loss and nosebleeds.    Eyes: Negative for blurred vision, double vision, pain and discharge.   Respiratory: Negative for cough and shortness of breath.    Cardiovascular: Positive for palpitations. Negative for chest pain, orthopnea, claudication, leg swelling and PND.   Gastrointestinal: Negative for abdominal pain, blood in stool, melena, nausea and vomiting.   Genitourinary: Negative for dysuria and hematuria.   Musculoskeletal: Negative for falls, joint pain and myalgias.   Skin:  Negative for itching and rash.   Neurological: Negative for dizziness, sensory change, speech change, loss of consciousness and headaches.   Endo/Heme/Allergies: Negative for environmental allergies. Does not bruise/bleed easily.   Psychiatric/Behavioral: Negative for depression, hallucinations and suicidal ideas.        Objective:   /80   Pulse 78   Ht 1.829 m (6')   Wt 86.2 kg (190 lb)   SpO2 98%   BMI 25.77 kg/m²     Physical Exam   Constitutional: He is oriented to person, place, and time. He appears well-developed and well-nourished. No distress.   HENT:   Head: Normocephalic and atraumatic.   Eyes: EOM are normal.   Neck: Normal range of motion. No JVD present.   Cardiovascular: Normal rate, regular rhythm, normal heart sounds and intact distal pulses.  Exam reveals no gallop and no friction rub.    No murmur heard.  Bilateral femoral pulses are 2+, bilateral dorsalis pedis pulses are 2+, bilateral posterior tibialis pulses are 2+.   Pulmonary/Chest: No respiratory distress. He has no wheezes. He has no rales. He exhibits no tenderness.   Abdominal: Soft. Bowel sounds are normal. There is no tenderness. There is no rebound and no guarding.   The is no presence of abdominal bruits   Musculoskeletal: Normal range of motion.   Neurological: He is alert and oriented to person, place, and time.   Skin: Skin is warm and dry.   Psychiatric: He has a normal mood and affect.   Nursing note and vitals reviewed.      Assessment:     1. Palpitations  ECHOCARDIOGRAM COMP W/O CONT    TREADMILL STRESS    [facility] EPIPHANY EKG (Clinic Performed)    HOLTER MONITOR STUDY   2. Attention deficit hyperactivity disorder (ADHD), predominantly inattentive type  ECHOCARDIOGRAM COMP W/O CONT    TREADMILL STRESS    [facility] EPIP[facility]ANY EKG (Clinic Performed)    HOLTER MONITOR STUDY   3. Other chest pain  ECHOCARDIOGRAM COMP W/O CONT    TREADMILL STRESS    [facility] EP[facility]ANY EKG (Clinic Performed)       Medical Decision Making:  Today's  Assessment / Status / Plan:   I will order transthoracic echocardiogram to assess for structural abnormalities.  I will also order exercise treadmill stress test  I will also order 48 hours home Holter monitoring.     I will see patient back in clinic with lab tests and studies results in 6 months.    I thank you Lorena for referring patient to our Cardiology Clinic today.

## 2017-11-01 NOTE — LETTER
[facility]   [address]  [place] [address]  Phone: [phone]  Fax: [phone]              Tomas Pozo  1990    Encounter Date: 11/1/2017    Mathieu Weaver M.D.          PROGRESS NOTE:  Subjective:   Tomas Pozo is a 27 y.o. male who presents today for evaluation of palpitations. Palpitation is sporadic but has some association with Adderal and caffeine. Patient feels like his heart is being pulled down. No family history of sudden cardiac death. No presyncope or syncope associated with his palpitations.    No prior history of sudden cardiac death.    Past Medical History:   Diagnosis Date   • History of palpitations      Past Surgical History:   Procedure Laterality Date   • OPEN REDUCTION     • THYROGLOSSAL DUCT EXCISION       Family History   Problem Relation Age of Onset   • No Known Problems Mother    • Hypertension Father    • Psychiatry Sister      History   Smoking Status   • Former Smoker   • Packs/day: 0.25   • Types: Cigarettes   • Quit date: 2/8/2013   Smokeless Tobacco   • Former User   • Quit date: 2/8/2014     No Known Allergies  Outpatient Encounter Prescriptions as of 11/1/2017   Medication Sig Dispense Refill   • Multiple Vitamins-Minerals (MULTIVITAMIN PO) Take  by mouth.     • Omega-3 Fatty Acids (FISH OIL) 1000 MG Cap capsule Take 1,000 mg by mouth 3 times a day, with meals.     • zolpidem (AMBIEN) 5 MG Tab Take 1 Tab by mouth at bedtime as needed for Sleep. 30 Tab 0     No facility-administered encounter medications on file as of 11/1/2017.      Review of Systems   Constitutional: Negative for chills, fever, malaise/fatigue and weight loss.   HENT: Negative for ear discharge, ear pain, hearing loss and nosebleeds.    Eyes: Negative for blurred vision, double vision, pain and discharge.   Respiratory: Negative for cough and shortness of breath.    Cardiovascular: Positive for palpitations. Negative for chest pain,  orthopnea, claudication, leg swelling and PND.   Gastrointestinal: Negative for abdominal pain, blood in stool, melena, nausea and vomiting.   Genitourinary: Negative for dysuria and hematuria.   Musculoskeletal: Negative for falls, joint pain and myalgias.   Skin: Negative for itching and rash.   Neurological: Negative for dizziness, sensory change, speech change, loss of consciousness and headaches.   Endo/Heme/Allergies: Negative for environmental allergies. Does not bruise/bleed easily.   Psychiatric/Behavioral: Negative for depression, hallucinations and suicidal ideas.        Objective:   /80   Pulse 78   Ht 1.829 m (6')   Wt 86.2 kg (190 lb)   SpO2 98%   BMI 25.77 kg/m²      Physical Exam   Constitutional: He is oriented to person, place, and time. He appears well-developed and well-nourished. No distress.   HENT:   Head: Normocephalic and atraumatic.   Eyes: EOM are normal.   Neck: Normal range of motion. No JVD present.   Cardiovascular: Normal rate, regular rhythm, normal heart sounds and intact distal pulses.  Exam reveals no gallop and no friction rub.    No murmur heard.  Bilateral femoral pulses are 2+, bilateral dorsalis pedis pulses are 2+, bilateral posterior tibialis pulses are 2+.   Pulmonary/Chest: No respiratory distress. He has no wheezes. He has no rales. He exhibits no tenderness.   Abdominal: Soft. Bowel sounds are normal. There is no tenderness. There is no rebound and no guarding.   The is no presence of abdominal bruits   Musculoskeletal: Normal range of motion.   Neurological: He is alert and oriented to person, place, and time.   Skin: Skin is warm and dry.   Psychiatric: He has a normal mood and affect.   Nursing note and vitals reviewed.      Assessment:     1. Palpitations  ECHOCARDIOGRAM COMP W/O CONT    TREADMILL STRESS    [facility] EPIPHANY EKG (Clinic Performed)    HOLTER MONITOR STUDY   2. Attention deficit hyperactivity disorder (ADHD), predominantly inattentive type   ECHOCARDIOGRAM COMP W/O CONT    TREADMILL STRESS    [facility] EPIPHANY EKG (Clinic Performed)    HOLTER MONITOR STUDY   3. Other chest pain  ECHOCARDIOGRAM COMP W/O CONT    TREADMILL STRESS    [facility] EP[facility] EKG (Clinic Performed)       Medical Decision Making:  Today's Assessment / Status / Plan:   I will order transthoracic echocardiogram to assess for structural abnormalities.  I will also order exercise treadmill stress test  I will also order 48 hours home Holter monitoring.     I will see patient back in clinic with lab tests and studies results in 6 months.    I thank you Lorena for referring patient to our Cardiology Clinic today.        Lorena Cross A.P.R.N.  [address] [place] [address]  VIA In Basket

## 2017-11-15 ENCOUNTER — APPOINTMENT (OUTPATIENT)
Dept: CARDIOLOGY | Facility: MEDICAL CENTER | Age: 27
End: 2017-11-15
Attending: INTERNAL MEDICINE
Payer: COMMERCIAL

## 2018-01-09 DIAGNOSIS — F51.01 PRIMARY INSOMNIA: ICD-10-CM

## 2018-01-10 RX ORDER — ZOLPIDEM TARTRATE 5 MG/1
5 TABLET ORAL NIGHTLY PRN
Qty: 30 TAB | Refills: 0 | Status: SHIPPED | OUTPATIENT
Start: 2018-01-10 | End: 2018-02-06 | Stop reason: SDUPTHER

## 2018-01-10 NOTE — TELEPHONE ENCOUNTER
Requested Prescriptions     Signed Prescriptions Disp Refills   • zolpidem (AMBIEN) 5 MG Tab 30 Tab 0     Sig: Take 1 Tab by mouth at bedtime as needed for Sleep for up to 30 days.     Authorizing Provider: TAMMY RAM A.P.R.N.

## 2018-02-06 ENCOUNTER — OFFICE VISIT (OUTPATIENT)
Dept: MEDICAL GROUP | Facility: PHYSICIAN GROUP | Age: 28
End: 2018-02-06
Payer: COMMERCIAL

## 2018-02-06 VITALS
OXYGEN SATURATION: 97 % | WEIGHT: 191 LBS | BODY MASS INDEX: 25.31 KG/M2 | SYSTOLIC BLOOD PRESSURE: 116 MMHG | DIASTOLIC BLOOD PRESSURE: 82 MMHG | RESPIRATION RATE: 14 BRPM | TEMPERATURE: 97.7 F | HEART RATE: 71 BPM | HEIGHT: 73 IN

## 2018-02-06 DIAGNOSIS — M54.2 NECK PAIN, CHRONIC: ICD-10-CM

## 2018-02-06 DIAGNOSIS — G89.29 NECK PAIN, CHRONIC: ICD-10-CM

## 2018-02-06 DIAGNOSIS — F51.01 PRIMARY INSOMNIA: ICD-10-CM

## 2018-02-06 DIAGNOSIS — F90.0 ATTENTION DEFICIT HYPERACTIVITY DISORDER (ADHD), PREDOMINANTLY INATTENTIVE TYPE: ICD-10-CM

## 2018-02-06 PROBLEM — R09.89 CHEST CONGESTION: Status: RESOLVED | Noted: 2017-05-04 | Resolved: 2018-02-06

## 2018-02-06 PROBLEM — R07.9 CHEST PAIN AT REST: Status: RESOLVED | Noted: 2017-02-08 | Resolved: 2018-02-06

## 2018-02-06 PROCEDURE — 99214 OFFICE O/P EST MOD 30 MIN: CPT | Performed by: NURSE PRACTITIONER

## 2018-02-06 RX ORDER — ZOLPIDEM TARTRATE 5 MG/1
5 TABLET ORAL NIGHTLY PRN
Qty: 60 TAB | Refills: 0 | Status: SHIPPED
Start: 2018-02-06 | End: 2018-05-02 | Stop reason: SDUPTHER

## 2018-02-06 ASSESSMENT — PATIENT HEALTH QUESTIONNAIRE - PHQ9: CLINICAL INTERPRETATION OF PHQ2 SCORE: 0

## 2018-02-07 NOTE — ASSESSMENT & PLAN NOTE
Had some neck injury with heavy lifting 4-5 years ago.  Would like to pursue massage, physical therapy or chiropractic care.  Will check with insurance.  No numbness, tingling reported.  Tightness in neck and upper back.  Works at a desk.

## 2018-02-07 NOTE — PROGRESS NOTES
"Chief Complaint   Patient presents with   • Medication Management   • Neck Pain     x's a couple years        Subjective:   Tomas Pozo is a 27 y.o. white male here today for evaluation and management of:    Primary insomnia  Taking ambien 5 mg very occasionally.  Last fill was  10/17/17.  UDS due.    Attention deficit hyperactivity disorder (ADHD), predominantly inattentive type  No longer taking adderal.  Would like some help with this.  Open to Behavioral therapist.       Neck pain, chronic  Had some neck injury with heavy lifting 4-5 years ago.  Would like to pursue massage, physical therapy or chiropractic care.  Will check with insurance.  No numbness, tingling reported.  Tightness in neck and upper back.  Works at a desk.           Current medicines (including changes today)  Current Outpatient Prescriptions   Medication Sig Dispense Refill   • zolpidem (AMBIEN) 5 MG Tab Take 1 Tab by mouth at bedtime as needed for Sleep for up to 60 days. 60 Tab 0   • Multiple Vitamins-Minerals (MULTIVITAMIN PO) Take  by mouth.     • Omega-3 Fatty Acids (FISH OIL) 1000 MG Cap capsule Take 1,000 mg by mouth 3 times a day, with meals.       No current facility-administered medications for this visit.      He  has a past medical history of History of palpitations.    ROS as stated in hpi  No chest pain, no shortness of breath, no abdominal pain       Objective:     Blood pressure 116/82, pulse 71, temperature 36.5 °C (97.7 °F), resp. rate 14, height 1.854 m (6' 1\"), weight 86.6 kg (191 lb), SpO2 97 %. Body mass index is 25.2 kg/m². stable.   Physical Exam:  Constitutional: Alert, no distress.  Skin: Warm, dry, good turgor,no cyanosis, no rashes in visible areas.  Eye: Equal, round and reactive, conjunctiva clear, lids normal.  Ears: No tenderness, no discharge.  External canals are without any significant edema or erythema.    Gross auditory acuity is intact.  Nose: symmetrical without tenderness, no discharge.  Mouth/Throat: " lips without lesion.  Oropharynx clear.    Neck: Trachea midline, no masses, no obvious thyroid enlargement..  Range of motion within normal limits. Tightness noted in trapezius and upper neck.  CMS normal.   Neuro: Cranial nerves 2-12 grossly intact.  No sensory deficit.  Respiratory: Unlabored respiratory effort, lungs clear to auscultation, no wheezes, no ronchi.  Cardiovascular: Normal S1, S2, no murmur, no edema.  Psych: Alert and oriented x3, normal affect and mood and judgement.        Assessment and Plan:   The following treatment plan was discussed    1. Primary insomnia  Chronic.  Ongoing. Stable.  Needs refill on Ambien.  Last filled 10/17.  UDS due.  Narc Check OK.  Refill provided.  RTC in 4 weeks for UDS/contract  - zolpidem (AMBIEN) 5 MG Tab; Take 1 Tab by mouth at bedtime as needed for Sleep for up to 60 days.  Dispense: 60 Tab; Refill: 0    2. Attention deficit hyperactivity disorder (ADHD), predominantly inattentive type  Chronic.  Ongoing.  Would like to see therapist as he did not like the side effects of the adderall.  Referral placed.   - REFERRAL TO BEHAVIORAL HEALTH    3. Neck pain, chronic  This is a new problem to me.  Chronic.  Patient will check with insurance to see their coverage regarding massage, Physical therapy or chiropractic care.  Monitor/follow.        Followup: Return in about 4 weeks (around 3/6/2018) for UDS.

## 2018-03-07 ENCOUNTER — OFFICE VISIT (OUTPATIENT)
Dept: MEDICAL GROUP | Facility: PHYSICIAN GROUP | Age: 28
End: 2018-03-07
Payer: COMMERCIAL

## 2018-03-07 VITALS
HEART RATE: 59 BPM | SYSTOLIC BLOOD PRESSURE: 116 MMHG | DIASTOLIC BLOOD PRESSURE: 82 MMHG | TEMPERATURE: 98 F | WEIGHT: 193 LBS | OXYGEN SATURATION: 98 % | HEIGHT: 73 IN | BODY MASS INDEX: 25.58 KG/M2 | RESPIRATION RATE: 14 BRPM

## 2018-03-07 DIAGNOSIS — F51.01 PRIMARY INSOMNIA: ICD-10-CM

## 2018-03-07 DIAGNOSIS — F90.0 ATTENTION DEFICIT HYPERACTIVITY DISORDER (ADHD), PREDOMINANTLY INATTENTIVE TYPE: ICD-10-CM

## 2018-03-07 PROCEDURE — 99214 OFFICE O/P EST MOD 30 MIN: CPT | Performed by: NURSE PRACTITIONER

## 2018-03-08 NOTE — PROGRESS NOTES
"Chief Complaint   Patient presents with   • Drug / Alcohol Assessment     UDS for Ambien        Subjective:   Tomas Pozo is a 27 y.o. male here today for evaluation and management of:    Primary insomnia  Taking ambien 1-3 nights/week.  Last dose was last night. Here for yearly UDS/agreement.  NarcChex without discrepancies.  Working on good sleep hygiene.  Shutting off computer 1 hour prior to bed.  Has a lot of stress at work which he feels is the issue.  Working on this.      Attention deficit hyperactivity disorder (ADHD), predominantly inattentive type  Occasional use of Adderall for long standing focus issue.  Takes one perhaps weekly or monthly           Current medicines (including changes today)  Current Outpatient Prescriptions   Medication Sig Dispense Refill   • zolpidem (AMBIEN) 5 MG Tab Take 1 Tab by mouth at bedtime as needed for Sleep for up to 60 days. 60 Tab 0   • Multiple Vitamins-Minerals (MULTIVITAMIN PO) Take  by mouth.     • Omega-3 Fatty Acids (FISH OIL) 1000 MG Cap capsule Take 1,000 mg by mouth 3 times a day, with meals.       No current facility-administered medications for this visit.      He  has a past medical history of History of palpitations and Insomnia.    ROS as stated in hpi  No chest pain, no shortness of breath, no abdominal pain       Objective:     Blood pressure 116/82, pulse (!) 59, temperature 36.7 °C (98 °F), resp. rate 14, height 1.854 m (6' 1\"), weight 87.5 kg (193 lb), SpO2 98 %. Body mass index is 25.46 kg/m².   Physical Exam:  Constitutional: Alert, no distress.  Skin: Warm, dry, good turgor,no cyanosis, no rashes in visible areas.  Eye: Equal, round and reactive, conjunctiva clear, lids normal.  Ears: No tenderness, no discharge.  External canals are without any significant edema or erythema.  .  Gross auditory acuity is intact.  Nose: symmetrical without tenderness, no discharge.  Mouth/Throat: lips without lesion.  Oropharynx clear.    Neck: Trachea midline, no " masses, no obvious thyroid enlargement... Range of motion within normal limits.  Neuro: Cranial nerves 2-12 grossly intact.  No sensory deficit.  Respiratory: Unlabored respiratory effort  Cardiovascular: Normal S1, S2, no murmur, no edema.  Psych: Alert and oriented x3, normal affect and mood and judgement.        Assessment and Plan:   The following treatment plan was discussed    1. Primary insomnia  Chronic.  Ongoing.  Taking ambien 1-3 nights/weekly.  UDS and controlled substance agreement signed.  Discussed risks of meds, controlled substance laws and sleep hygiene.   without discrepancies.  Last dose was last night.  Monitor and follow return in one year.   - [facility] PAIN MANAGEMENT SCREEN; Future  - Controlled Substance Treatment Agreement    2. Attention deficit hyperactivity disorder (ADHD), predominantly inattentive type  Chronic.  Ongoing issue.  Taking adderall rarely when needing to focus on specific work projects.  Monitor and follow.       Followup: Return in about 1 year (around 3/7/2019) for Annual.

## 2018-03-08 NOTE — ASSESSMENT & PLAN NOTE
Taking ambien 1-3 nights/week.  Last dose was last night. Here for yearly UDS/agreement.  NarcChex without discrepancies.  Working on good sleep hygiene.  Shutting off computer 1 hour prior to bed.  Has a lot of stress at work which he feels is the issue.  Working on this.

## 2018-05-02 DIAGNOSIS — F51.01 PRIMARY INSOMNIA: ICD-10-CM

## 2018-05-02 RX ORDER — ZOLPIDEM TARTRATE 5 MG/1
5 TABLET ORAL NIGHTLY PRN
Qty: 60 TAB | Refills: 0 | Status: SHIPPED
Start: 2018-05-02 | End: 2018-06-07 | Stop reason: SDUPTHER

## 2018-05-03 NOTE — TELEPHONE ENCOUNTER
Requested Prescriptions     Signed Prescriptions Disp Refills   • zolpidem (AMBIEN) 5 MG Tab 60 Tab 0     Sig: Take 1 Tab by mouth at bedtime as needed for Sleep for up to 60 days.     Authorizing Provider: TAMMY RAM     UDS and narc check without discrepancies  WILLIAM Lockett.

## 2018-05-03 NOTE — TELEPHONE ENCOUNTER
RX FAXED TO [facility] # - [place], [place] - [address]  [address] [place] [address]  Phone: [phone] Fax: [phone]

## 2018-05-03 NOTE — TELEPHONE ENCOUNTER
ERICKA BERNARD     Age: 27  demographics   Data as of: 5/3/2018              NARCOTIC 050        SEDATIVE 110       STIMULANT 030       NARxSCORES can range from 000 to 999. The first two digits represent the composite percentile risk based on an overall analysis of prescription drug use. The third digit represents the number of active prescriptions. The distribution of scores in the population is such that approximately 75% fall below 200, 95% fall below 500 and 99% fall below 650. The information on this report is not warranted as accurate or complete. This report is based on the search criteria supplied and the data entered by the dispensing pharmacy. For more information about any prescription, please contact the dispensing pharmacy or the prescriber. NARxSCORES and Reports are intended to aid, not replace medical decision making. None of the information presented should be used as sole justification for providing or refusing to provide medications.       Rx Graph   [x] Narcotic [x] Sedative [x] Stimulant [x] Buprenorphine [x] Other    Created with Raphaël [id number] - Casey, Lorena  Lorazepam MgEq (LME)  Created with Raphaël [id number]  Created with Raphaël 2.1.3Hofyfdvo56/[id number]  *Per CDC guidance, the MME conversion factors prescribed or provided as part of the medication-assisted treatment for opioid use disorder should not be used to benchmark against dosage thresholds meant for opioids prescribed for pain. Buprenorphine products have no agreed upon morphine equivalency, and as partial opioid agonists, are not expected to be associated with overdose risk in the same dose-dependent manner as doses for full agonist opioids. MME = morphine milligram equivalents. LME = Lorazepam milligram equivalents. mg = dose in milligrams.     Data Analysis   Narcotics (050)  2 months  6 months  1 year  2 years    Prescribers (narcotic, sedative) 0  0  1  12  1  8  1  6    Pharmacies  (narcotic, sedative) 0  0  1  16  1  13  1  10    Morphine mg 0  0  0  0  0  0  0  0    Morphine overlap (1) 0  0  0  0  0  0  0  0            Sedatives (110)  2 months  6 months  1 year  2 years    Prescribers (narcotic, sedative) 0  0  1  12  1  8  1  6    Pharmacies (narcotic, sedative) 0  0  1  16  1  13  1  10    Sedative mg 0  0  15  20  38  33  52  41    Sedative overlap (1) 0  0  0  0  0  0  0  0            Stimulants (030)  2 months  6 months  1 year  2 years    Prescribers (stimulant) 0  0  0  0  0  0  1  6    Pharmacies (stimulant) 0  0  0  0  0  0  1  10    Stimulant days 0  0  0  0  21  4  90  33    Stimulant overlap (1) 0  0  0  0  0  0  0  0       (1) Number of days for which a simliar type of medication was prescribed from different prescribers for use on the same day.         Summary   Total Prescriptions: 9   Total Prescribers: 1   Total Pharmacies: 1   Narcotics* (excluding buprenorphine):   Current Qty: 0   Current MME/day: 0.00   30 Day Avg MME/day: 0.00   Buprenorphine*   Current Qty: 0   Current mg/day: 0.00   30 Day Avg mg/day: 0.00   Sedatives*   Current Qty: 0   Current LME/day: 0.00   30 Day Avg LME/day: 0.14     Prescriptions Total Prescriptions: 9 Private Pay: 0   Fill Date PT Written Drug Qty Days Rx # Prescriber Pharmacy Refill Daily Dose * Pymt Type    02/19/2018 1  02/06/2018 ZOLPIDEM TARTRATE 5 MG TABLET 60 60 [id number] LO [facility] 0 0.25 [facility] [place]   10/17/2017 1  10/17/2017 ZOLPIDEM TARTRATE 5 MG TABLET 30 30 [id number] LO [facility] 0 0.25 [facility] [place]   08/17/2017 1  08/17/2017 ZOLPIDEM TARTRATE 5 MG TABLET 30 30 [id number] LO [facility] 0 0.25 [facility] [place]   06/23/2017 1  06/23/2017 ZOLPIDEM TARTRATE 5 MG TABLET 30 30 [id number] LO [facility] 0 0.25 [facility] [place]   04/25/2017 1  04/25/2017 ZOLPIDEM TARTRATE 5 MG TABLET 30 30 [id number] LO [facility] 0 0.25 [facility] [place]   04/24/2017 1  02/08/2017 DEXTROAMP-AMPHETAMIN 20 MG TAB  60 30 [id number] LO IV[facility]T [facility] 0  [facility] [place]   03/24/2017 1  02/08/2017 DEXTROAMP-AMPHETAMIN 20 MG TAB 60 30 [id number] LO [facility] 0  [facility] [place]   02/08/2017 1  02/08/2017 ZOLPIDEM TARTRATE 5 MG TABLET 30 30 [id number] LO [facility] 0 0.25 LME [facility] [place]   02/08/2017 1  02/08/2017 DEXTROAMP-AMPHETAMIN 20 MG TAB 60 30 [id number] LO IV[facility]T [facility] 0  [facility] [place]     Providers Total Providers: 1   Name Address [facility] TAMMY CLOUD [address] [address] [id number]     Pharmacies Total Pharmacies: 1   Name Address [facility] [address] [address] [id number]

## 2018-06-07 DIAGNOSIS — F51.01 PRIMARY INSOMNIA: ICD-10-CM

## 2018-06-07 RX ORDER — ZOLPIDEM TARTRATE 5 MG/1
5 TABLET ORAL NIGHTLY PRN
Qty: 60 TAB | Refills: 0 | Status: SHIPPED
Start: 2018-06-07 | End: 2018-08-06 | Stop reason: SDUPTHER

## 2018-06-07 RX ORDER — ZOLPIDEM TARTRATE 5 MG/1
5 TABLET ORAL NIGHTLY PRN
Qty: 60 TAB | Refills: 0 | Status: CANCELLED | OUTPATIENT
Start: 2018-06-07 | End: 2018-08-06

## 2018-06-07 NOTE — TELEPHONE ENCOUNTER
From: Tomas Pozo  Sent: 6/7/2018 1:36 PM PDT  Subject: Medication Renewal Request    Tomas Nohelia would like a refill of the following medications:     zolpidem (AMBIEN) 5 MG Tab [Lorena Cross A.P.R.N.]   Patient Comment: Hello, I forgot to  the Ambien from the pharmacy and they said I could no longer pick it up. Could you please fax a new script or ask them to allow me to  the last one? Thank you.     Preferred pharmacy: [facility] # - [place], [place] - [address].

## 2018-07-24 ENCOUNTER — OFFICE VISIT (OUTPATIENT)
Dept: MEDICAL GROUP | Facility: PHYSICIAN GROUP | Age: 28
End: 2018-07-24
Payer: COMMERCIAL

## 2018-07-24 VITALS
WEIGHT: 185 LBS | RESPIRATION RATE: 14 BRPM | BODY MASS INDEX: 24.52 KG/M2 | DIASTOLIC BLOOD PRESSURE: 80 MMHG | OXYGEN SATURATION: 95 % | HEART RATE: 65 BPM | HEIGHT: 73 IN | SYSTOLIC BLOOD PRESSURE: 116 MMHG | TEMPERATURE: 98.2 F

## 2018-07-24 DIAGNOSIS — F51.01 PRIMARY INSOMNIA: ICD-10-CM

## 2018-07-24 DIAGNOSIS — F90.0 ATTENTION DEFICIT HYPERACTIVITY DISORDER (ADHD), PREDOMINANTLY INATTENTIVE TYPE: ICD-10-CM

## 2018-07-24 PROCEDURE — 99214 OFFICE O/P EST MOD 30 MIN: CPT | Performed by: NURSE PRACTITIONER

## 2018-07-25 NOTE — ASSESSMENT & PLAN NOTE
Work is going well unless he is at a meeting.  Not currently taking adderall.  Is wondering if his anxiety is related to anxiety and depression. Having some episodes that almost seem manic where he cannot settle down.  Difficulty sleeping.  Sometimes forgets to eat. Will refer to psychiatry.

## 2018-07-25 NOTE — PROGRESS NOTES
"Chief Complaint   Patient presents with   • Medication Management   • Back Strain     upper       Subjective:   Tomas Pozo is a 28 y.o. male here today for evaluation and management of:    Attention deficit hyperactivity disorder (ADHD), predominantly inattentive type  Work is going well unless he is at a meeting.  Not currently taking adderall.  Is wondering if his anxiety is related to anxiety and depression. Having some episodes that almost seem manic where he cannot settle down.  Difficulty sleeping.  Sometimes forgets to eat. Will refer to psychiatry.     Primary insomnia  Reporting that he feels like he needs to take 2-3 some night.  Sometimes feels so driven he cant relax.          Current medicines (including changes today)  Current Outpatient Prescriptions   Medication Sig Dispense Refill   • zolpidem (AMBIEN) 5 MG Tab Take 1 Tab by mouth at bedtime as needed for Sleep for up to 60 days. 60 Tab 0   • Multiple Vitamins-Minerals (MULTIVITAMIN PO) Take  by mouth.     • Omega-3 Fatty Acids (FISH OIL) 1000 MG Cap capsule Take 1,000 mg by mouth 3 times a day, with meals.       No current facility-administered medications for this visit.      He  has a past medical history of History of palpitations and Insomnia.    ROS as stated in hpi  No chest pain, no shortness of breath, no abdominal pain       Objective:     Blood pressure 116/80, pulse 65, temperature 36.8 °C (98.2 °F), resp. rate 14, height 1.854 m (6' 1\"), weight 83.9 kg (185 lb), SpO2 95 %. Body mass index is 24.41 kg/m².   Physical Exam:  Constitutional: Alert, no distress.  Skin: Warm, dry, good turgor,no cyanosis, no rashes in visible areas.  Eye: Equal, round and reactive, conjunctiva clear, lids normal.  Ears: No tenderness, no discharge.  External canals are without any significant edema or erythema.  Tympanic membranes are without any inflammation, no effusion.  Gross auditory acuity is intact.  Nose: symmetrical without tenderness, no " discharge.  Mouth/Throat: lips without lesion.  Oropharynx clear.  Throat without erythema, exudates or tonsillar enlargement.  Neck: Trachea midline, no masses, no obvious thyroid enlargement.. No cervical or supraclavicular lymphadenopathy. Range of motion within normal limits.  Neuro: Cranial nerves 2-12 grossly intact.  No sensory deficit.  Respiratory: Unlabored respiratory effort, lungs clear to auscultation, no wheezes, no ronchi.  Cardiovascular: Normal S1, S2, no murmur, no edema.  Abdomen: Soft, non-tender, no masses, no guarding,  no hepatosplenomegaly.  Psych: Alert and oriented x3, normal affect and mood and judgement.reports some anxiety and some depression with episodes of sleepliness.         Assessment and Plan:   The following treatment plan was discussed    1. Attention deficit hyperactivity disorder (ADHD), predominantly inattentive type  Chronic, ongoing. Unstable.  Patient not currently taking adderall.  Symptoms seem more depressive along with anxiety.  May be having some bipolar episodes.  Referral to psych placed.  Monitor and follow.   - REFERRAL TO PSYCHIATRY    2. Primary insomnia  Chronic, ongoing, unstable.  Having episodes of several days where he cannot sleep.  Ambien discussed.  Will monitor and follow.  Referral to Psych placed.        Followup: Return in about 3 months (around 10/24/2018) for anxiety.

## 2018-07-25 NOTE — ASSESSMENT & PLAN NOTE
Reporting that he feels like he needs to take 2-3 some night.  Sometimes feels so driven he cant relax.

## 2018-09-28 NOTE — TELEPHONE ENCOUNTER
Requested Prescriptions     Signed Prescriptions Disp Refills   • zolpidem (AMBIEN) 5 MG Tab 30 Tab 0     Sig: Take 1 Tab by mouth at bedtime as needed for Sleep.     Authorizing Provider: TAMMY RAM       Controlled substance agreement in force  JESSE Lockett     electronic auscultated w/ stethoscope

## 2018-10-29 ENCOUNTER — OFFICE VISIT (OUTPATIENT)
Dept: MEDICAL GROUP | Facility: PHYSICIAN GROUP | Age: 28
End: 2018-10-29
Payer: COMMERCIAL

## 2018-10-29 VITALS
OXYGEN SATURATION: 96 % | SYSTOLIC BLOOD PRESSURE: 126 MMHG | RESPIRATION RATE: 14 BRPM | BODY MASS INDEX: 24.78 KG/M2 | TEMPERATURE: 97.9 F | DIASTOLIC BLOOD PRESSURE: 84 MMHG | WEIGHT: 187 LBS | HEIGHT: 73 IN | HEART RATE: 78 BPM

## 2018-10-29 DIAGNOSIS — F32.A ANXIETY AND DEPRESSION: ICD-10-CM

## 2018-10-29 DIAGNOSIS — F41.9 ANXIETY AND DEPRESSION: ICD-10-CM

## 2018-10-29 DIAGNOSIS — F90.0 ATTENTION DEFICIT HYPERACTIVITY DISORDER (ADHD), PREDOMINANTLY INATTENTIVE TYPE: ICD-10-CM

## 2018-10-29 DIAGNOSIS — F51.01 PRIMARY INSOMNIA: ICD-10-CM

## 2018-10-29 PROCEDURE — 99214 OFFICE O/P EST MOD 30 MIN: CPT | Performed by: NURSE PRACTITIONER

## 2018-10-29 RX ORDER — ZOLPIDEM TARTRATE 5 MG/1
TABLET ORAL
Qty: 45 TAB | Refills: 1 | Status: SHIPPED
Start: 2018-10-29 | End: 2018-12-05 | Stop reason: SDUPTHER

## 2018-10-29 RX ORDER — DEXTROAMPHETAMINE SACCHARATE, AMPHETAMINE ASPARTATE, DEXTROAMPHETAMINE SULFATE AND AMPHETAMINE SULFATE 5; 5; 5; 5 MG/1; MG/1; MG/1; MG/1
TABLET ORAL
Qty: 30 TAB | Refills: 0 | Status: SHIPPED | OUTPATIENT
Start: 2018-10-29 | End: 2018-11-28 | Stop reason: SDUPTHER

## 2018-10-29 RX ORDER — SERTRALINE HYDROCHLORIDE 25 MG/1
25 TABLET, FILM COATED ORAL DAILY
Qty: 30 TAB | Refills: 1 | Status: SHIPPED | OUTPATIENT
Start: 2018-10-29 | End: 2018-12-15

## 2018-10-30 NOTE — PROGRESS NOTES
"Chief Complaint   Patient presents with   • Follow-Up   • ADHD   • Insomnia       Subjective:   Tomas Pozo is a 28 y.o. male here today for evaluation and management of:    Primary insomnia  Sleeping is hit or miss with the Ambien.      Anxiety and depression  Reports some history  Of suicidal thoughts and depression with break through anxiety.  Feels like his chest is tight and out of control.  Recently stopped adderall 3-4 weeks ago.  Will start some zoloft.    Seeing psychology.     Attention deficit hyperactivity disorder (ADHD), predominantly inattentive type  Stopped adderall 3-4 weeks ago.  Feeling anxious and paranoid at times with worry about getting fired.           Current medicines (including changes today)  Current Outpatient Prescriptions   Medication Sig Dispense Refill   • sertraline (ZOLOFT) 25 MG tablet Take 1 Tab by mouth every day. 30 Tab 1   • amphetamine-dextroamphetamine (ADDERALL) 20 MG Tab Take 1/2 tablet bid 30 Tab 0   • zolpidem (AMBIEN) 5 MG Tab Take 1 to 1 and 1/2 tablets nightly prn sleep 45 Tab 1   • Multiple Vitamins-Minerals (MULTIVITAMIN PO) Take  by mouth.     • Omega-3 Fatty Acids (FISH OIL) 1000 MG Cap capsule Take 1,000 mg by mouth 3 times a day, with meals.       No current facility-administered medications for this visit.      He  has a past medical history of History of palpitations and Insomnia.    ROS as stated in hpi  No chest pain, no shortness of breath, no abdominal pain       Objective:     Blood pressure 126/84, pulse 78, temperature 36.6 °C (97.9 °F), temperature source Temporal, resp. rate 14, height 1.854 m (6' 1\"), weight 84.8 kg (187 lb), SpO2 96 %. Body mass index is 24.67 kg/m².   Physical Exam:  Constitutional: Alert, no distress.  Skin: Warm, dry, good turgor,no cyanosis, no rashes in visible areas.  Eye: Equal, round and reactive, conjunctiva clear, lids normal.  Ears: No tenderness, no discharge.  External canals are without any significant edema or " erythema. .  Gross auditory acuity is intact.  Nose: symmetrical without tenderness, no discharge.  Mouth/Throat: lips without lesion.  Oropharynx clear.   Neck: Trachea midline, no masses, no obvious thyroid enlargement.. . Range of motion within normal limits.  Neuro: Cranial nerves 2-12 grossly intact.  No sensory deficit.  Respiratory: Unlabored respiratory effort, lungs clear to auscultation, no wheezes, no ronchi.  Cardiovascular: Normal S1, S2, no murmur, no edema.  Abdomen: Soft, non-tender, no masses, no guarding,  no hepatosplenomegaly.  Psych: Alert and oriented x3, normal affect and mood and judgement. Feelings of depression and numbness expressed.  No suicidal plan or previous attempts reported.         Assessment and Plan:   The following treatment plan was discussed    1. Primary insomnia  Chronic, ongoing.  Refill provided.  No discrepancy noted.   - zolpidem (AMBIEN) 5 MG Tab; Take 1 to 1 and 1/2 tablets nightly prn sleep  Dispense: 45 Tab; Refill: 1    2. Anxiety and depression  This is a new problem to me.  Chronic, ongoing, worsening.  Referral to psychiatry.  Continue seeing therapist.  Will try sertraline 25 mg daily as a bridge until seen by psychiatry.  Return in 4 weeks for face to face evaluation.  ED precautions reviewed.   - REFERRAL TO PSYCHIATRY    3. Attention deficit hyperactivity disorder (ADHD), predominantly inattentive type  Chronic, ongoing. Unstable.  This may represent break through anxiety with his depression rather than ADHD>  Referral to psychiatry for evaluation.    - REFERRAL TO PSYCHIATRY  - amphetamine-dextroamphetamine (ADDERALL) 20 MG Tab; Take 1/2 tablet bid  Dispense: 30 Tab; Refill: 0      Followup: Return in about 4 weeks (around 11/26/2018) for depression/anxiety.         Educated in proper administration of medication(s) ordered today including safety, possible SE, risks, benefits, rationale and alternatives to therapy.     Please note that this dictation was  created using voice recognition software. I have made every reasonable attempt to correct obvious errors, but I expect that there are errors of grammar and possibly content that I did not discover before finalizing the note.

## 2018-10-30 NOTE — ASSESSMENT & PLAN NOTE
Stopped adderall 3-4 weeks ago.  Feeling anxious and paranoid at times with worry about getting fired.

## 2018-10-30 NOTE — ASSESSMENT & PLAN NOTE
Reports some history  Of suicidal thoughts and depression with break through anxiety.  Feels like his chest is tight and out of control.  Recently stopped adderall 3-4 weeks ago.  Will start some zoloft.    Seeing psychology.

## 2018-11-28 ENCOUNTER — OFFICE VISIT (OUTPATIENT)
Dept: MEDICAL GROUP | Facility: PHYSICIAN GROUP | Age: 28
End: 2018-11-28
Payer: COMMERCIAL

## 2018-11-28 VITALS
HEART RATE: 66 BPM | WEIGHT: 191 LBS | HEIGHT: 73 IN | OXYGEN SATURATION: 95 % | BODY MASS INDEX: 25.31 KG/M2 | RESPIRATION RATE: 14 BRPM | DIASTOLIC BLOOD PRESSURE: 68 MMHG | SYSTOLIC BLOOD PRESSURE: 122 MMHG | TEMPERATURE: 97.7 F

## 2018-11-28 DIAGNOSIS — F90.0 ATTENTION DEFICIT HYPERACTIVITY DISORDER (ADHD), PREDOMINANTLY INATTENTIVE TYPE: ICD-10-CM

## 2018-11-28 DIAGNOSIS — F32.A ANXIETY AND DEPRESSION: ICD-10-CM

## 2018-11-28 DIAGNOSIS — F41.9 ANXIETY AND DEPRESSION: ICD-10-CM

## 2018-11-28 DIAGNOSIS — F51.01 PRIMARY INSOMNIA: ICD-10-CM

## 2018-11-28 PROCEDURE — 99213 OFFICE O/P EST LOW 20 MIN: CPT | Performed by: NURSE PRACTITIONER

## 2018-11-28 RX ORDER — DEXTROAMPHETAMINE SACCHARATE, AMPHETAMINE ASPARTATE, DEXTROAMPHETAMINE SULFATE AND AMPHETAMINE SULFATE 5; 5; 5; 5 MG/1; MG/1; MG/1; MG/1
TABLET ORAL
Qty: 30 TAB | Refills: 0 | Status: SHIPPED | OUTPATIENT
Start: 2018-11-28 | End: 2019-01-17 | Stop reason: SDUPTHER

## 2018-11-28 NOTE — PROGRESS NOTES
"Chief Complaint   Patient presents with   • Follow-Up   • Insomnia   • Anxiety   • Depression       Subjective:   Tomas Pozo is a 28 y.o. male here today for evaluation and management of:    Primary insomnia  Sleeping pretty well without ambien. Taking it occasionally.      Anxiety and depression  Has noticed some improvement with sertraline. Less dark cloud feeling.  Would like to increase to 50 mg. Has started taking during the day time to see if this makes a difference.  Still seeing therapist every week or two weeks.      Attention deficit hyperactivity disorder (ADHD), predominantly inattentive type  Has not been taking at this time.  Was not able to fill due to pharmacy being out of stock.  Needs new RX.  No discrepancy noted on narc check.            Current medicines (including changes today)  Current Outpatient Prescriptions   Medication Sig Dispense Refill   • amphetamine-dextroamphetamine (ADDERALL) 20 MG Tab Take 1/2 tablet bid 30 Tab 0   • sertraline (ZOLOFT) 25 MG tablet Take 1 Tab by mouth every day. 30 Tab 1   • zolpidem (AMBIEN) 5 MG Tab Take 1 to 1 and 1/2 tablets nightly prn sleep 45 Tab 1   • Multiple Vitamins-Minerals (MULTIVITAMIN PO) Take  by mouth.     • Omega-3 Fatty Acids (FISH OIL) 1000 MG Cap capsule Take 1,000 mg by mouth 3 times a day, with meals.       No current facility-administered medications for this visit.      He  has a past medical history of Anxiety; Depression; History of palpitations; and Insomnia.    ROS as stated in hpi  No chest pain, no shortness of breath, no abdominal pain       Objective:     Blood pressure 122/68, pulse 66, temperature 36.5 °C (97.7 °F), temperature source Temporal, resp. rate 14, height 1.854 m (6' 1\"), weight 86.6 kg (191 lb), SpO2 95 %. Body mass index is 25.2 kg/m². stable.   Physical Exam:  Constitutional: Alert, no distress.  Skin: Warm, dry, good turgor,no cyanosis, no rashes in visible areas.  Eye: Equal, round and reactive, conjunctiva " clear, lids normal.  Ears: No tenderness, no discharge.  External canals are without any significant edema or erythema.  .  Gross auditory acuity is intact.  Nose: symmetrical without tenderness, no discharge.  Mouth/Throat: lips without lesion.  Oropharynx clear.    Neck: Trachea midline, no masses, no obvious thyroid enlargement.. No cervical or supraclavicular lymphadenopathy. Range of motion within normal limits.  Neuro: Cranial nerves 2-12 grossly intact.  No sensory deficit.  Respiratory: Unlabored respiratory effort, lungs clear to auscultation, no wheezes, no ronchi.  Cardiovascular: Normal S1, S2, no murmur, no edema.  Abdomen: Soft, non-tender, no masses, no guarding,  no hepatosplenomegaly.  Psych: Alert and oriented x3, normal affect and mood and judgement.  Improved mood reported.         Assessment and Plan:   The following treatment plan was discussed    1. Primary insomnia  Chronic, ongoing. Stable.  Occasional Ambien use.  Last fill was in august 2018.     2. Anxiety and depression  Chronic,ongoing. Improving with sertraline 25 mg daily.  Would like to increase to 50 mg daily.  Has RX for this.  Start today.  Will let me know how he is feeling in 3 weeks.  Continue with therapist.  Recommended a consult with psychiatry regarding antidepressants and ADHD.  No reported suicidal thoughts or history of attempts.  Red flag warnings reviewed.  Return in 3 months.     3. Attention deficit hyperactivity disorder (ADHD), predominantly inattentive type  Chronic, ongoing. Stable.  Not currently taking adderall,  RX given since his last pharmacy was out of stock and he could not get it filled prior to expiration.  No discrepancy noted on narc chex.  Last refill in 8/18.  Monitor and follow.   - amphetamine-dextroamphetamine (ADDERALL) 20 MG Tab; Take 1/2 tablet bid  Dispense: 30 Tab; Refill: 0      Followup: Return in about 3 months (around 2/28/2019) for depression.         Educated in proper administration of  medication(s) ordered today including safety, possible SE, risks, benefits, rationale and alternatives to therapy.     Please note that this dictation was created using voice recognition software. I have made every reasonable attempt to correct obvious errors, but I expect that there are errors of grammar and possibly content that I did not discover before finalizing the note.

## 2018-11-28 NOTE — ASSESSMENT & PLAN NOTE
Has not been taking at this time.  Was not able to fill due to pharmacy being out of stock.  Needs new RX.  No discrepancy noted on narc check.

## 2018-11-28 NOTE — ASSESSMENT & PLAN NOTE
Has noticed some improvement with sertraline. Less dark cloud feeling.  Would like to increase to 50 mg. Has started taking during the day time to see if this makes a difference.  Still seeing therapist every week or two weeks.

## 2018-12-05 DIAGNOSIS — F51.01 PRIMARY INSOMNIA: ICD-10-CM

## 2018-12-05 RX ORDER — ZOLPIDEM TARTRATE 5 MG/1
TABLET ORAL
Qty: 45 TAB | Refills: 1 | Status: SHIPPED
Start: 2018-12-05 | End: 2019-01-17 | Stop reason: SDUPTHER

## 2018-12-05 NOTE — TELEPHONE ENCOUNTER
Requested Prescriptions     Signed Prescriptions Disp Refills   • zolpidem (AMBIEN) 5 MG Tab 45 Tab 1     Sig: Take 1 to 1 and 1/2 tablets nightly prn sleep     Authorizing Provider: TAMMY RAM A.P.R.N.

## 2018-12-05 NOTE — TELEPHONE ENCOUNTER
Was the patient seen in the last year in this department? Yes 11/28/18    Does patient have an active prescription for medications requested? No     Received Request Via: Pharmacy

## 2018-12-06 NOTE — TELEPHONE ENCOUNTER
Prescription faxed to:    [facility]/pharmacy #[id number] - [place], [place] - [address] [place] [address]  Phone: [phone] Fax: [phone]  .

## 2018-12-15 ENCOUNTER — OFFICE VISIT (OUTPATIENT)
Dept: URGENT CARE | Facility: PHYSICIAN GROUP | Age: 28
End: 2018-12-15
Payer: COMMERCIAL

## 2018-12-15 ENCOUNTER — HOSPITAL ENCOUNTER (OUTPATIENT)
Dept: RADIOLOGY | Facility: MEDICAL CENTER | Age: 28
End: 2018-12-15
Attending: NURSE PRACTITIONER
Payer: COMMERCIAL

## 2018-12-15 VITALS
DIASTOLIC BLOOD PRESSURE: 80 MMHG | SYSTOLIC BLOOD PRESSURE: 120 MMHG | HEART RATE: 74 BPM | OXYGEN SATURATION: 96 % | BODY MASS INDEX: 25.18 KG/M2 | RESPIRATION RATE: 12 BRPM | WEIGHT: 190 LBS | HEIGHT: 73 IN | TEMPERATURE: 97.9 F

## 2018-12-15 DIAGNOSIS — S52.502A CLOSED FRACTURE OF DISTAL END OF LEFT RADIUS, UNSPECIFIED FRACTURE MORPHOLOGY, INITIAL ENCOUNTER: ICD-10-CM

## 2018-12-15 DIAGNOSIS — S69.92XA INJURY OF LEFT WRIST, INITIAL ENCOUNTER: ICD-10-CM

## 2018-12-15 PROCEDURE — 99214 OFFICE O/P EST MOD 30 MIN: CPT | Performed by: NURSE PRACTITIONER

## 2018-12-15 PROCEDURE — 73110 X-RAY EXAM OF WRIST: CPT | Mod: LT

## 2018-12-15 RX ORDER — HYDROCODONE BITARTRATE AND ACETAMINOPHEN 5; 325 MG/1; MG/1
1-2 TABLET ORAL EVERY 6 HOURS PRN
Qty: 12 TAB | Refills: 0 | Status: SHIPPED | OUTPATIENT
Start: 2018-12-15 | End: 2018-12-23

## 2018-12-16 NOTE — ADDENDUM NOTE
Addended by: EMETERIO HOLLINS on: 12/15/2018 05:31 PM     Modules accepted: Orders, Level of Service

## 2018-12-16 NOTE — PROGRESS NOTES
Chief Complaint   Patient presents with   • Wrist Injury     snowboarding         HISTORY OF PRESENT ILLNESS: Patient is a 28 y.o. male who presents to urgent care today with complaints of left wrist pain.  States that he was snowboarding earlier today and fell, with his left hand stretched out.  This happened midday today at [facility].  She has had pain and swelling to his wrist since.  Denies previous injuries to this wrist.  He is tried ice for symptom relief.  Denies other areas of injury or trauma.    Patient Active Problem List    Diagnosis Date Noted   • Anxiety and depression 10/29/2018   • Neck pain, chronic 02/06/2018   • Attention deficit hyperactivity disorder (ADHD), predominantly inattentive type 02/08/2017   • Primary insomnia 02/08/2017       Allergies:Patient has no known allergies.    Current Outpatient Prescriptions Ordered in [facility]   Medication Sig Dispense Refill   • zolpidem (AMBIEN) 5 MG Tab Take 1 to 1 and 1/2 tablets nightly prn sleep 45 Tab 1   • amphetamine-dextroamphetamine (ADDERALL) 20 MG Tab Take 1/2 tablet bid 30 Tab 0   • Multiple Vitamins-Minerals (MULTIVITAMIN PO) Take  by mouth.     • Omega-3 Fatty Acids (FISH OIL) 1000 MG Cap capsule Take 1,000 mg by mouth 3 times a day, with meals.     • sertraline (ZOLOFT) 50 MG Tab        No current Epic-ordered facility-administered medications on file.        Past Medical History:   Diagnosis Date   • ADHD    • Anxiety    • Depression    • History of palpitations    • Insomnia        Social History   Substance Use Topics   • Smoking status: Former Smoker     Packs/day: 0.25     Types: Cigarettes     Quit date: 2/8/2013   • Smokeless tobacco: Former User     Quit date: 2/8/2014   • Alcohol use 1.8 oz/week     3 Cans of beer per week      Comment: weekends       Family Status   Relation Status   • Mo Alive   • Fa Alive   • Sis Alive   • Bro Alive   • Sis Alive     Family History   Problem Relation Age of Onset   • No Known Problems  "Mother    • Hypertension Father    • Psychiatry Sister        ROS:  Review of Systems   Constitutional: Negative for fever, chills, weight loss, malaise, and fatigue.   HENT: Negative for ear pain, nosebleeds, congestion, sore throat and neck pain.    Eyes: Negative for vision changes.   Neuro: Negative for headache, sensory changes, weakness, seizure, LOC.   Cardiovascular: Negative for chest pain, palpitations, orthopnea and leg swelling.   Respiratory: Negative for cough, sputum production, shortness of breath and wheezing.   Gastrointestinal: Negative for abdominal pain, nausea, vomiting or diarrhea.   Genitourinary: Negative for dysuria, urgency and frequency.  Musculoskeletal: Positive for falls, left wrist pain.  Negative for neck pain, back pain, myalgias.   Skin: Negative for rash, diaphoresis.     Exam:  Blood pressure 120/80, pulse 74, temperature 36.6 °C (97.9 °F), temperature source Temporal, resp. rate 12, height 1.854 m (6' 1\"), weight 86.2 kg (190 lb), SpO2 96 %.  General: well-nourished, well-developed male in NAD  Head: normocephalic, atraumatic  Eyes: PERRLA, no conjunctival injection, acuity grossly intact, lids normal.  Ears: normal shape and symmetry, no tenderness, no discharge. External canals are without any significant edema or erythema. Tympanic membranes are without any inflammation, no effusion. Gross auditory acuity is intact.  Nose: symmetrical without tenderness, no discharge.  Mouth/Throat: reasonable hygiene, no erythema, exudates or tonsillar enlargement.  Neck: no masses, range of motion within normal limits, no tracheal deviation. No obvious thyroid enlargement.   Lymph: no cervical adenopathy. No supraclavicular adenopathy.   Neuro: alert and oriented. Cranial nerves 1-12 grossly intact. No sensory deficit.   Cardiovascular: regular rate and rhythm. No edema.  Pulmonary: no distress. Chest is symmetrical with respiration, no wheezes, crackles, or rhonchi.   Musculoskeletal: no " clubbing, appropriate muscle tone, gait is stable.  Left wrist is swollen, tender to distal aspect of both radius and ulnar, limited range of motion of wrist secondary to pain.  Distal neurovascular intact and cap refill brisk.   Skin: warm, dry, intact, no clubbing, no cyanosis, no rashes.   Psych: appropriate mood, affect, judgement.         Assessment/Plan:  1. Closed fracture of distal end of left radius, unspecified fracture morphology, initial encounter  DX-WRIST-COMPLETE 3+ LEFT    REFERRAL TO ORTHOPEDICS             X-ray notes radius fracture by myself, awaiting radiology reading, placed in orthoglass splint, N/V post placement. RICE. OTC NSAIDS. Referral to ortho placed. Must follow up Monday. Norco for pain relief, sedative effects of medication discussed with patient. [facility] Aware web site evaluation: I have obtained and reviewed patient utilization report from [facility] [place] pharmacy database prior to writing prescription for controlled substance II, III or IV per [place] bill . Based on the report and my clinical assessment the prescription is medically necessary.   Consent obtained and signed.   Supportive care, differential diagnoses, and indications for immediate follow-up discussed with patient.   Pathogenesis of diagnosis discussed including typical length and natural progression.   Instructed to return to clinic or nearest emergency department for any change in condition, further concerns, or worsening of symptoms.  Patient states understanding of the plan of care and discharge instructions.          Please note that this dictation was created using voice recognition software. I have made every reasonable attempt to correct obvious errors, but I expect that there are errors of grammar and possibly content that I did not discover before finalizing the note.      WILLIAM Hawkins.

## 2019-01-17 ENCOUNTER — PATIENT MESSAGE (OUTPATIENT)
Dept: MEDICAL GROUP | Facility: PHYSICIAN GROUP | Age: 29
End: 2019-01-17

## 2019-01-17 DIAGNOSIS — F51.01 PRIMARY INSOMNIA: ICD-10-CM

## 2019-01-17 DIAGNOSIS — F90.0 ATTENTION DEFICIT HYPERACTIVITY DISORDER (ADHD), PREDOMINANTLY INATTENTIVE TYPE: ICD-10-CM

## 2019-01-17 RX ORDER — ZOLPIDEM TARTRATE 5 MG/1
TABLET ORAL
Qty: 60 TAB | Refills: 1 | Status: SHIPPED
Start: 2019-01-17 | End: 2019-02-17

## 2019-01-17 RX ORDER — DEXTROAMPHETAMINE SACCHARATE, AMPHETAMINE ASPARTATE, DEXTROAMPHETAMINE SULFATE AND AMPHETAMINE SULFATE 5; 5; 5; 5 MG/1; MG/1; MG/1; MG/1
TABLET ORAL
Qty: 30 TAB | Refills: 0 | Status: SHIPPED | OUTPATIENT
Start: 2019-01-17 | End: 2019-02-17

## 2019-01-17 NOTE — PATIENT COMMUNICATION
Requested Prescriptions     Signed Prescriptions Disp Refills   • zolpidem (AMBIEN) 5 MG Tab 60 Tab 1     Sig: Take 1 to 1 and 1/2 tablets nightly prn sleep     Authorizing Provider: TAMMY RAM A.P.R.N.

## 2019-01-24 NOTE — TELEPHONE ENCOUNTER
Requested Prescriptions     Signed Prescriptions Disp Refills   • sertraline (ZOLOFT) 50 MG Tab 30 Tab 11     Sig: TAKE 1 TABLET BY MOUTH EVERY DAY     Authorizing Provider: TAMMY RAM A.P.R.N.

## 2019-06-19 ENCOUNTER — OFFICE VISIT (OUTPATIENT)
Dept: MEDICAL GROUP | Facility: PHYSICIAN GROUP | Age: 29
End: 2019-06-19
Payer: COMMERCIAL

## 2019-06-19 VITALS
SYSTOLIC BLOOD PRESSURE: 126 MMHG | WEIGHT: 197 LBS | BODY MASS INDEX: 26.11 KG/M2 | HEIGHT: 73 IN | OXYGEN SATURATION: 97 % | TEMPERATURE: 98.1 F | DIASTOLIC BLOOD PRESSURE: 80 MMHG | HEART RATE: 78 BPM | RESPIRATION RATE: 14 BRPM

## 2019-06-19 DIAGNOSIS — F90.0 ATTENTION DEFICIT HYPERACTIVITY DISORDER (ADHD), PREDOMINANTLY INATTENTIVE TYPE: ICD-10-CM

## 2019-06-19 DIAGNOSIS — R20.2 NUMBNESS AND TINGLING OF LEFT HAND: ICD-10-CM

## 2019-06-19 DIAGNOSIS — R20.0 NUMBNESS AND TINGLING OF LEFT HAND: ICD-10-CM

## 2019-06-19 DIAGNOSIS — F31.77 BIPOLAR DISORDER, IN PARTIAL REMISSION, MOST RECENT EPISODE MIXED (HCC): ICD-10-CM

## 2019-06-19 PROCEDURE — 99214 OFFICE O/P EST MOD 30 MIN: CPT | Performed by: NURSE PRACTITIONER

## 2019-06-19 RX ORDER — DEXTROAMPHETAMINE SACCHARATE, AMPHETAMINE ASPARTATE, DEXTROAMPHETAMINE SULFATE AND AMPHETAMINE SULFATE 3.75; 3.75; 3.75; 3.75 MG/1; MG/1; MG/1; MG/1
TABLET ORAL
Refills: 0 | COMMUNITY
Start: 2019-06-04 | End: 2019-09-14

## 2019-06-19 RX ORDER — LAMOTRIGINE 200 MG/1
200 TABLET ORAL
Refills: 0 | COMMUNITY
Start: 2019-05-28 | End: 2021-10-16

## 2019-06-19 NOTE — PROGRESS NOTES
"Chief Complaint   Patient presents with   • Numbness     left hand x's 3 weeks        Subjective:   Tomas Pozo is a 28 y.o. male here today for evaluation and management of:    Attention deficit hyperactivity disorder (ADHD), predominantly inattentive type  Taking adderall and lamictal and this is helping him.  Mood swings have settled down.     Bipolar disorder, in partial remission, most recent episode mixed (HCC)  Seeing psychiatry, Dr. Tracy.  On lamictal and doing well.  No depression reported, no manic episodes.  Feels his mood is stable.     Numbness and tingling of left hand  Reports left hand 4th and 5th finger numbness.  Noticed after lifting weight and mountain biking.  No pain reported.  - tinnels sign. Suspect nerve entrapment from activities that cause flexion.          Current medicines (including changes today)  Current Outpatient Prescriptions   Medication Sig Dispense Refill   • lamotrigine (LAMICTAL) 200 MG tablet Take 200 mg by mouth.  0   • amphetamine-dextroamphetamine (ADDERALL) 15 MG tablet TAKE 1 TABLET BY MOUTH TWICE A DAY  0   • Omega-3 Fatty Acids (FISH OIL) 1000 MG Cap capsule Take 1,000 mg by mouth 3 times a day, with meals.     • sertraline (ZOLOFT) 50 MG Tab TAKE 1 TABLET BY MOUTH EVERY DAY (Patient not taking: Reported on 6/19/2019) 30 Tab 11   • Multiple Vitamins-Minerals (MULTIVITAMIN PO) Take  by mouth.       No current facility-administered medications for this visit.      He  has a past medical history of ADHD; Anxiety; Depression; History of palpitations; and Insomnia.    ROS as stated in hpi  No chest pain, no shortness of breath, no abdominal pain       Objective:     /80 (BP Location: Left arm, Patient Position: Sitting)   Pulse 78   Temp 36.7 °C (98.1 °F) (Temporal)   Resp 14   Ht 1.854 m (6' 1\")   Wt 89.4 kg (197 lb)   SpO2 97%  Body mass index is 25.99 kg/m². stable.   Physical Exam:  Constitutional: Alert, no distress.  Skin: Warm, dry, good turgor,no " cyanosis, no rashes in visible areas.  Eye: Equal, round and reactive, conjunctiva clear, lids normal.  Ears: No tenderness, no discharge.  External canals are without any significant edema or erythema.  .  Gross auditory acuity is intact.  Nose: symmetrical without tenderness, no discharge.  Mouth/Throat: lips without lesion.  Oropharynx clear.  Neck: Trachea midline, no masses, no obvious thyroid enlargement. Range of motion within normal limits.  Neuro: Cranial nerves 2-12 grossly intact.  No sensory deficit.  Respiratory: Unlabored respiratory effort,  MSK:  Left hand with 5th finger numb, 4th finger tip numb.  No pain -tinnels and - finkelstein test.   Psych: Alert and oriented x3, normal affect and mood and judgement.        Assessment and Plan:   The following treatment plan was discussed    1. Attention deficit hyperactivity disorder (ADHD), predominantly inattentive type  Chronic, ongoing. Followed by psych.  Doing well with adderall and focus.     2. Numbness and tingling of left hand  This is a new problem to me.  Acute issue.  Suspect nerve injury from gripping handlebars and weight lifting.  Recommend wrist brace, ice and stretching.  If not improving in the next 4 weeks, will consider referral to hand, ortho.  Monitor and follow.     3. Bipolar disorder, in partial remission, most recent episode mixed (HCC)  This is a new problem to me.  Followed by psychiatry.  Lamictal is helping his moods be stable.  Next appointment in 2 months.  Monitor       Followup: Return in about 1 year (around 6/19/2020) for Annual.         Educated in proper administration of medication(s) ordered today including safety, possible SE, risks, benefits, rationale and alternatives to therapy.     Please note that this dictation was created using voice recognition software. I have made every reasonable attempt to correct obvious errors, but I expect that there are errors of grammar and possibly content that I did not discover  before finalizing the note.

## 2019-06-19 NOTE — ASSESSMENT & PLAN NOTE
Reports left hand 4th and 5th finger numbness.  Noticed after lifting weight and mountain biking.  No pain reported.  - tinnels sign. Suspect nerve entrapment from activities that cause flexion.

## 2019-06-19 NOTE — ASSESSMENT & PLAN NOTE
Seeing psychiatry, Dr. Tracy.  On lamictal and doing well.  No depression reported, no manic episodes.  Feels his mood is stable.

## 2019-06-23 ENCOUNTER — OFFICE VISIT (OUTPATIENT)
Dept: URGENT CARE | Facility: CLINIC | Age: 29
End: 2019-06-23
Payer: COMMERCIAL

## 2019-06-23 VITALS
HEART RATE: 78 BPM | OXYGEN SATURATION: 99 % | RESPIRATION RATE: 14 BRPM | BODY MASS INDEX: 26.11 KG/M2 | HEIGHT: 73 IN | TEMPERATURE: 97.6 F | SYSTOLIC BLOOD PRESSURE: 118 MMHG | DIASTOLIC BLOOD PRESSURE: 68 MMHG | WEIGHT: 197 LBS

## 2019-06-23 DIAGNOSIS — S81.011A LACERATION OF RIGHT KNEE, INITIAL ENCOUNTER: ICD-10-CM

## 2019-06-23 DIAGNOSIS — T14.8XXA ABRASION: ICD-10-CM

## 2019-06-23 PROCEDURE — 90471 IMMUNIZATION ADMIN: CPT | Performed by: PHYSICIAN ASSISTANT

## 2019-06-23 PROCEDURE — 90715 TDAP VACCINE 7 YRS/> IM: CPT | Performed by: PHYSICIAN ASSISTANT

## 2019-06-23 PROCEDURE — 99214 OFFICE O/P EST MOD 30 MIN: CPT | Mod: 25 | Performed by: PHYSICIAN ASSISTANT

## 2019-06-23 ASSESSMENT — ENCOUNTER SYMPTOMS
FEVER: 0
SHORTNESS OF BREATH: 0
PALPITATIONS: 0
COUGH: 0

## 2019-06-24 NOTE — PROGRESS NOTES
Subjective:      Tomas Pozo is a 28 y.o. male who presents with Laceration (right knee and right shoulder)            Laceration    The incident occurred 1 to 3 hours ago. Pain location: right shoulder and knee. The laceration is 3 cm in size. Injury mechanism: rocks. The patient is experiencing no pain. The pain has been constant since onset. He reports no foreign bodies present. His tetanus status is out of date.       Review of Systems   Constitutional: Negative for fever and malaise/fatigue.   Respiratory: Negative for cough and shortness of breath.    Cardiovascular: Negative for chest pain and palpitations.   Skin: Negative for itching and rash.        Abrasion and laceration of knee     All other systems reviewed and are negative.    PMH:  has a past medical history of ADHD; Anxiety; Depression; History of palpitations; and Insomnia.  MEDS:   Current Outpatient Prescriptions:   •  lamotrigine (LAMICTAL) 200 MG tablet, Take 200 mg by mouth., Disp: , Rfl: 0  •  amphetamine-dextroamphetamine (ADDERALL) 15 MG tablet, TAKE 1 TABLET BY MOUTH TWICE A DAY, Disp: , Rfl: 0  •  sertraline (ZOLOFT) 50 MG Tab, TAKE 1 TABLET BY MOUTH EVERY DAY (Patient not taking: Reported on 6/19/2019), Disp: 30 Tab, Rfl: 11  •  Multiple Vitamins-Minerals (MULTIVITAMIN PO), Take  by mouth., Disp: , Rfl:   •  Omega-3 Fatty Acids (FISH OIL) 1000 MG Cap capsule, Take 1,000 mg by mouth 3 times a day, with meals., Disp: , Rfl:   ALLERGIES: No Known Allergies  SURGHX:   Past Surgical History:   Procedure Laterality Date   • OPEN REDUCTION     • THYROGLOSSAL DUCT EXCISION       SOCHX:  reports that he quit smoking about 6 years ago. His smoking use included Cigarettes. He smoked 0.25 packs per day. He quit smokeless tobacco use about 5 years ago. He reports that he drinks about 1.8 oz of alcohol per week . He reports that he uses drugs, including Marijuana.  FH: Family history was reviewed, no pertinent findings to report  Medications,  "Allergies, and current problem list reviewed today in Epic       Objective:     /68   Pulse 78   Temp 36.4 °C (97.6 °F) (Temporal)   Resp 14   Ht 1.854 m (6' 1\")   Wt 89.4 kg (197 lb)   SpO2 99%   BMI 25.99 kg/m²      Physical Exam   Constitutional: He is oriented to person, place, and time. He appears well-developed and well-nourished.  Non-toxic appearance. He does not have a sickly appearance. He does not appear ill. No distress.   HENT:   Head: Normocephalic and atraumatic.   Right Ear: External ear normal.   Left Ear: External ear normal.   Eyes: Conjunctivae and EOM are normal.   Neck: Normal range of motion. Neck supple.   Cardiovascular: Normal rate, regular rhythm, normal heart sounds, intact distal pulses and normal pulses.    Pulmonary/Chest: Effort normal and breath sounds normal.   Musculoskeletal: Normal range of motion. He exhibits tenderness. He exhibits no edema or deformity.   Full ROM, no bony tenderness.  No joint pain above or below injury.  Neurovascularly intact distally from injury.     Neurological: He is alert and oriented to person, place, and time. He has normal reflexes. He displays normal reflexes. He exhibits normal muscle tone. Coordination normal.   Skin: Skin is warm and dry. He is not diaphoretic.   Large abrasion of right shoulder and knee.  3 cm laceration of knee   Psychiatric: He has a normal mood and affect. His behavior is normal. Judgment and thought content normal.   Vitals reviewed.              Assessment/Plan:     1. Abrasion  - cleaned and bandaged  - Tdap Vaccine =>8YO IM    2. Laceration of right knee, initial encounter  - 3 cm laceration repaired with steri-strips  - Tdap Vaccine =>7YO IM    Differential diagnosis, natural history, supportive care discussed. Follow-up with primary care provider within 7-10 days, emergency room precautions discussed.  Patient and/or family appears understanding of information.  Handout and review of patients diagnosis and " treatment was discussed extensively.

## 2019-09-03 ENCOUNTER — OFFICE VISIT (OUTPATIENT)
Dept: URGENT CARE | Facility: PHYSICIAN GROUP | Age: 29
End: 2019-09-03
Payer: COMMERCIAL

## 2019-09-03 ENCOUNTER — HOSPITAL ENCOUNTER (OUTPATIENT)
Facility: MEDICAL CENTER | Age: 29
End: 2019-09-03
Attending: PHYSICIAN ASSISTANT
Payer: COMMERCIAL

## 2019-09-03 VITALS
RESPIRATION RATE: 12 BRPM | HEIGHT: 73 IN | SYSTOLIC BLOOD PRESSURE: 126 MMHG | OXYGEN SATURATION: 97 % | BODY MASS INDEX: 25.18 KG/M2 | TEMPERATURE: 98.3 F | HEART RATE: 59 BPM | DIASTOLIC BLOOD PRESSURE: 84 MMHG | WEIGHT: 190 LBS

## 2019-09-03 DIAGNOSIS — R36.9 PENILE DISCHARGE: ICD-10-CM

## 2019-09-03 DIAGNOSIS — Z72.51 HISTORY OF UNPROTECTED SEX: ICD-10-CM

## 2019-09-03 PROCEDURE — 87491 CHLMYD TRACH DNA AMP PROBE: CPT

## 2019-09-03 PROCEDURE — 99214 OFFICE O/P EST MOD 30 MIN: CPT | Performed by: PHYSICIAN ASSISTANT

## 2019-09-03 PROCEDURE — 87591 N.GONORRHOEAE DNA AMP PROB: CPT

## 2019-09-03 RX ORDER — AZITHROMYCIN 500 MG/1
1000 TABLET, FILM COATED ORAL ONCE
Qty: 2 TAB | Refills: 0 | Status: SHIPPED | OUTPATIENT
Start: 2019-09-03 | End: 2019-09-03

## 2019-09-03 ASSESSMENT — ENCOUNTER SYMPTOMS
FEVER: 0
ARTHRALGIAS: 0
ABDOMINAL PAIN: 0
CHILLS: 0
VOMITING: 0
VISUAL CHANGE: 0
NAUSEA: 0

## 2019-09-03 NOTE — PROGRESS NOTES
Subjective:   Tomas Pozo is a 29 y.o. male who presents for Exposure to STD (discharge, itchiness)        Sex of Partner: female  Type of Blossom: vaginal and oral  Barrier Protection: unprotected  Birth Control: -  Prior STI: none  Discharge: orange discharge, red pitchy urethra  Lesion: nothing  Pain: no.  LMP:      Sexually Transmitted Diseases   This is a new problem. The current episode started in the past 7 days. The problem occurs constantly. The problem has been unchanged. Pertinent negatives include no abdominal pain, arthralgias, chills, fever, nausea, visual change or vomiting. Nothing aggravates the symptoms. He has tried rest and sleep for the symptoms. The treatment provided no relief.     Review of Systems   Constitutional: Negative for chills and fever.   Gastrointestinal: Negative for abdominal pain, nausea and vomiting.   Musculoskeletal: Negative for arthralgias.       PMH:  has a past medical history of ADHD, Anxiety, Depression, History of palpitations, and Insomnia.  MEDS:   Current Outpatient Medications:   •  azithromycin (ZITHROMAX) 500 MG tablet, Take 2 Tabs by mouth Once for 1 dose., Disp: 2 Tab, Rfl: 0  •  lamotrigine (LAMICTAL) 200 MG tablet, Take 200 mg by mouth., Disp: , Rfl: 0  •  Multiple Vitamins-Minerals (MULTIVITAMIN PO), Take  by mouth., Disp: , Rfl:   •  Omega-3 Fatty Acids (FISH OIL) 1000 MG Cap capsule, Take 1,000 mg by mouth 3 times a day, with meals., Disp: , Rfl:   •  amphetamine-dextroamphetamine (ADDERALL) 15 MG tablet, TAKE 1 TABLET BY MOUTH TWICE A DAY, Disp: , Rfl: 0  •  sertraline (ZOLOFT) 50 MG Tab, TAKE 1 TABLET BY MOUTH EVERY DAY (Patient not taking: Reported on 6/19/2019), Disp: 30 Tab, Rfl: 11  ALLERGIES: No Known Allergies  SURGHX:   Past Surgical History:   Procedure Laterality Date   • OPEN REDUCTION     • THYROGLOSSAL DUCT EXCISION       SOCHX:  reports that he quit smoking about 6 years ago. His smoking use included cigarettes. He smoked 0.25 packs  "per day. He quit smokeless tobacco use about 5 years ago. He reports that he drinks about 1.8 oz of alcohol per week. He reports that he has current or past drug history. Drug: Marijuana.  FH: Family history was reviewed, no pertinent findings to report   Objective:   /84   Pulse (!) 59   Temp 36.8 °C (98.3 °F)   Resp 12   Ht 1.854 m (6' 1\")   Wt 86.2 kg (190 lb)   SpO2 97%   BMI 25.07 kg/m²   Physical Exam   Constitutional: He is oriented to person, place, and time. He appears well-developed and well-nourished.  Non-toxic appearance. No distress.   HENT:   Head: Normocephalic and atraumatic.   Right Ear: External ear normal.   Left Ear: External ear normal.   Nose: Nose normal.   Eyes: Conjunctivae and lids are normal.   Neck: Neck supple.   Cardiovascular: Normal rate and regular rhythm.   Pulmonary/Chest: Effort normal and breath sounds normal. No respiratory distress.   Abdominal: Normal appearance.   Genitourinary:   Genitourinary Comments: Refuses  exam.   Musculoskeletal:   Normal range of motion. Exhibits no edema and no tenderness.    Neurological: He is alert and oriented to person, place, and time. No cranial nerve deficit or sensory deficit.   Skin: Skin is warm, dry and intact. Capillary refill takes less than 2 seconds.   Psychiatric: He has a normal mood and affect. His speech is normal and behavior is normal. Judgment and thought content normal. Cognition and memory are normal.   Vitals reviewed.        Assessment/Plan:   1. History of unprotected sex  - CHLAMYDIA/GC PCR URINE OR SWAB; Future  - T.PALLIDUM AB EIA; Future  - HIV AG/AB COMBO ASSAY SCREENING; Future  - azithromycin (ZITHROMAX) 500 MG tablet; Take 2 Tabs by mouth Once for 1 dose.  Dispense: 2 Tab; Refill: 0  - cefTRIAXone (ROCEPHIN) 250 mg, lidocaine (XYLOCAINE) 1 % 0.9 mL for IM use    2. Penile discharge  - CHLAMYDIA/GC PCR URINE OR SWAB; Future  - T.PALLIDUM AB EIA; Future  - HIV AG/AB COMBO ASSAY SCREENING; Future  - " azithromycin (ZITHROMAX) 500 MG tablet; Take 2 Tabs by mouth Once for 1 dose.  Dispense: 2 Tab; Refill: 0  - cefTRIAXone (ROCEPHIN) 250 mg, lidocaine (XYLOCAINE) 1 % 0.9 mL for IM use    Patient treated.  I will contact patient with results and adjust treatment plan as indicated.  Avoid intercourse for 1 days.  Test of cure not indicated.  Patient advised that repeat testing can remain positive for up to 3 to 4 weeks.    Differential diagnosis, natural history, supportive care, and indications for immediate follow-up discussed.

## 2019-09-04 DIAGNOSIS — R36.9 PENILE DISCHARGE: ICD-10-CM

## 2019-09-04 DIAGNOSIS — Z72.51 HISTORY OF UNPROTECTED SEX: ICD-10-CM

## 2019-09-04 LAB
C TRACH DNA SPEC QL NAA+PROBE: POSITIVE
N GONORRHOEA DNA SPEC QL NAA+PROBE: NEGATIVE
SPECIMEN SOURCE: ABNORMAL

## 2019-09-05 ENCOUNTER — TELEPHONE (OUTPATIENT)
Dept: URGENT CARE | Facility: CLINIC | Age: 29
End: 2019-09-05

## 2019-09-05 NOTE — TELEPHONE ENCOUNTER
Patient contacted with results.  He has already been appropriately treated.  No further interventions indicated at this time.

## 2019-09-14 ENCOUNTER — APPOINTMENT (OUTPATIENT)
Dept: RADIOLOGY | Facility: IMAGING CENTER | Age: 29
End: 2019-09-14
Attending: PHYSICIAN ASSISTANT
Payer: COMMERCIAL

## 2019-09-14 ENCOUNTER — OFFICE VISIT (OUTPATIENT)
Dept: URGENT CARE | Facility: CLINIC | Age: 29
End: 2019-09-14
Payer: COMMERCIAL

## 2019-09-14 VITALS
TEMPERATURE: 98.5 F | HEART RATE: 68 BPM | OXYGEN SATURATION: 96 % | SYSTOLIC BLOOD PRESSURE: 122 MMHG | RESPIRATION RATE: 16 BRPM | DIASTOLIC BLOOD PRESSURE: 78 MMHG | HEIGHT: 73 IN | BODY MASS INDEX: 26.35 KG/M2 | WEIGHT: 198.8 LBS
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
## 2019-09-14 DIAGNOSIS — S52.109A CLOSED FRACTURE OF PROXIMAL END OF RADIUS WITHOUT ADDITIONAL FRACTURE: Primary | ICD-10-CM

## 2019-09-14 DIAGNOSIS — S63.92XA SPRAIN OF LEFT HAND, INITIAL ENCOUNTER: ICD-10-CM

## 2019-09-14 PROCEDURE — 99214 OFFICE O/P EST MOD 30 MIN: CPT | Performed by: PHYSICIAN ASSISTANT

## 2019-09-14 PROCEDURE — 73080 X-RAY EXAM OF ELBOW: CPT | Mod: TC,RT | Performed by: PHYSICIAN ASSISTANT

## 2019-09-14 PROCEDURE — 73130 X-RAY EXAM OF HAND: CPT | Mod: TC,LT | Performed by: PHYSICIAN ASSISTANT

## 2019-09-14 RX ORDER — OXYCODONE HYDROCHLORIDE AND ACETAMINOPHEN 5; 325 MG/1; MG/1
1 TABLET ORAL EVERY 8 HOURS PRN
Qty: 9 TAB | Refills: 0 | Status: SHIPPED | OUTPATIENT
Start: 2019-09-14 | End: 2019-09-17

## 2019-09-14 ASSESSMENT — ENCOUNTER SYMPTOMS
FEVER: 0
VISUAL CHANGE: 0
CONSTIPATION: 0
SHORTNESS OF BREATH: 0
HEADACHES: 0
ABDOMINAL PAIN: 0
DIARRHEA: 0
VOMITING: 0
LOSS OF CONSCIOUSNESS: 0
TINGLING: 0
FALLS: 1
NUMBNESS: 0
COUGH: 0
NAUSEA: 0
CHILLS: 0

## 2019-09-14 NOTE — PATIENT INSTRUCTIONS
Radial Fracture  A radial fracture is a break in the radius bone, which is the long bone of the forearm that is on the same side as your thumb. Your forearm is the part of your arm that is between your elbow and your wrist. It is made up of two bones: the radius and the ulna.  Most radial fractures occur near the wrist (distal radial fracture) or near the elbow (radial head fracture). A distal radial fracture is the most common type of broken arm. This fracture usually occurs about an inch above the wrist. Fractures of the middle part of the bone are less common.  What are the causes?  Falling with your arm outstretched is the most common cause of a radial fracture. Other causes include:  · Car accidents.  · Bike accidents.  · A direct blow to the middle part of the radius.  What increases the risk?  · You may be at greater risk for a distal radial fracture if you are 60 years of age or older.  · You may be at greater risk for a radial head fracture if you are:  ¨ Female.  ¨ 30-40 years old.  · You may be at a greater risk for all types of radial fractures if you have a condition that causes your bones to be weak or thin (osteoporosis).  What are the signs or symptoms?  A radial fracture causes pain immediately after the injury. Other signs and symptoms include:  · An abnormal bend or bump in your arm (deformity).  · Swelling.  · Bruising.  · Numbness or tingling.  · Tenderness.  · Limited movement.  How is this diagnosed?  Your health care provider may diagnose a radial fracture based on:  · Your symptoms.  · Your medical history, including any recent injury.  · A physical exam. Your health care provider will look for any deformity and feel for tenderness over the break. Your health care provider will also check whether the bone is out of place.  · An X-ray exam to confirm the diagnosis and learn more about the type of fracture.  How is this treated?  The goals of treatment are to get the bone in proper position  for healing and to keep it from moving so it will heal over time. Your treatment will depend on many factors, especially the type of fracture that you have.  · If the fractured bone:  ¨ Is in the correct position (nondisplaced), you may only need to wear a cast or a splint.  ¨ Has a slightly displaced fracture, you may need to have the bones moved back into place manually (closed reduction) before the splint or cast is put on.  · You may have a temporary splint before you have a plaster cast. The splint allows room for some swelling. After a few days, a cast can replace the splint.  ¨ You may have to wear the cast for about 6 weeks or as directed by your health care provider.  ¨ The cast may be changed after about 3 weeks or as directed by your health care provider.  · After your cast is taken off, you may need physical therapy to regain full movement in your wrist or elbow.  · You may need emergency surgery if you have:  ¨ A fractured bone that is out of position (displaced).  ¨ A fracture with multiple fragments (comminuted fracture).  ¨ A fracture that breaks the skin (open fracture). This type of fracture may require surgical wires, plates, or screws to hold the bone in place.  · You may have X-rays every couple of weeks to check on your healing.  Follow these instructions at home:  · Keep the injured arm above the level of your heart while you are sitting or lying down. This helps to reduce swelling and pain.  · Apply ice to the injured area:  ¨ Put ice in a plastic bag.  ¨ Place a towel between your skin and the bag.  ¨ Leave the ice on for 20 minutes, 2-3 times per day.  · Move your fingers often to avoid stiffness and to minimize swelling.  · If you have a plaster or fiberglass cast:  ¨ Do not try to scratch the skin under the cast using sharp or pointed objects.  ¨ Check the skin around the cast every day. You may put lotion on any red or sore areas.  ¨ Keep your cast dry and clean.  · If you have a plaster  splint:  ¨ Wear the splint as directed.  ¨ Loosen the elastic around the splint if your fingers become numb and tingle, or if they turn cold and blue.  · Do not put pressure on any part of your cast until it is fully hardened. Rest your cast only on a pillow for the first 24 hours.  · Protect your cast or splint while bathing or showering, as directed by your health care provider. Do not put your cast or splint into water.  · Take medicines only as directed by your health care provider.  · Return to activities, such as sports, as directed by your health care provider. Ask your health care provider what activities are safe for you.  · Keep all follow-up visits as directed by your health care provider. This is important.  Contact a health care provider if:  · Your pain medicine is not helping.  · Your cast gets damaged or it breaks.  · Your cast becomes loose.  · Your cast gets wet.  · You have more severe pain or swelling than you did before the cast.  · You have severe pain when stretching your fingers.  · You continue to have pain or stiffness in your elbow or your wrist after your cast is taken off.  Get help right away if:  · You cannot move your fingers.  · You lose feeling in your fingers or your hand.  · Your hand or your fingers turn cold and pale or blue.  · You notice a bad smell coming from your cast.  · You have drainage from underneath your cast.  · You have new stains from blood or drainage seeping through your cast.  This information is not intended to replace advice given to you by your health care provider. Make sure you discuss any questions you have with your health care provider.  Document Released: 05/31/2007 Document Revised: 05/23/2017 Document Reviewed: 06/12/2015  Elsevier Interactive Patient Education © 2017 [employer]        Radial Fracture Rehab  Ask your health care provider which exercises are safe for you. Do exercises exactly as told by your health care provider and adjust them as  "directed. It is normal to feel mild stretching, pulling, tightness, or discomfort as you do these exercises, but you should stop right away if you feel sudden pain or your pain gets worse. Do not begin these exercises until told by your health care provider.  Stretching and range of motion exercises  These exercises warm up your muscles and joints and improve the movement and flexibility of your wrist and forearm. These exercises also help to relieve pain, numbness, and tingling.  Exercise A: Wrist flexion, active-assisted  1. Extend your __________ arm in front of you, and point your fingers downward.  2. If told by your health care provider, bend your __________ arm.  3. Gently tip the palm of your hand toward your forearm until you cannot go any farther. You should feel a gentle stretch on the top of your forearm and wrist.  4. If told by your health care provider, use your other hand to help move your palm further.  5. Hold this position for __________ seconds.  6. Slowly return to the starting position.  Repeat __________ times. Complete this exercise __________ times a day.  Exercise B: Wrist extension, active-assisted  1. Extend your __________ arm in front of you and turn your palm upward.  2. If told by your health care provider, bend your __________ arm.  3. Tip your palm and fingertips back so your fingers point downward. Go as far as you can without feeling pain.  4. If told by your health care provider, use your other hand to help move your hand further. You should feel a gentle stretch on the inside of your forearm.  5. Hold this position for __________ seconds.  6. Slowly return to the starting position.  Repeat __________ times. Complete this exercise __________ times a day.  Exercise C: Supination, active  1. Stand or sit with your arms at your side.  2. Bend your __________ elbow to an \"L\" shape (90 degrees).  3. Turn your palm upward until you feel a gentle stretch in the inside of your " "forearm.  4. Hold this position for __________ seconds.  5. Slowly return your palm to the starting position.  Repeat __________ times. Complete this stretch __________ times a day.  Exercise D: Pronation, active  1. Stand or sit with your elbows at your side.  2. Bend your __________ elbow to an \"L\" shape (90 degrees).  3. Turn your palm downward until you feel a gentle stretch in the top of your forearm.  4. Hold this position for __________ seconds.  5. Slowly return your palm to the starting position.  Repeat __________ times. Complete this stretch __________ times a day.  Exercise E: Ulnar deviation  1. Start with your __________ arm supported at your side or on a table.  2. Move your __________ wrist and hand so your pinkie travels toward your forearm and your thumb moves away from your forearm.  3. Hold this position for __________ seconds.  4. Slowly return your wrist to the starting position.  Repeat __________ times. Complete this stretch __________ times a day.  Exercise F: Radial deviation  1. Start with your __________ arm supported at your side or on a table.  2. Move your __________ wrist and hand so your thumb moves toward your forearm.  3. Hold this position for __________ seconds.  4. Slowly return to the starting position.  Repeat __________ times. Complete this stretch __________ times a day.  Strengthening exercises  These exercises build strength and endurance in your forearm. Endurance is the ability to use your muscles for a long time, even after they get tired.  Exercise G: Wrist flexors  1. Sit with your __________ forearm supported on a table and your hand resting palm-up over the edge of the table. Your elbow should be below the level of your shoulder.  2. Hold a __________ weight in your __________ hand. Or, hold a rubber exercise band or tube in both hands, keeping your hands at the same level and hip distance apart. There should be a slight tension in the exercise band or " tube.  3. Slowly curl your hand up toward your forearm.  4. Hold this position for __________ seconds.  5. Slowly lower your hand to the starting position.  Repeat __________ times. Complete this exercise __________ times a day.  Exercise H: Wrist extensors  1. Sit with your __________ forearm supported on a table and your hand resting palm-down over the edge of the table. Your elbow should be below the level of your shoulder.  2. Hold a __________ weight in your __________ hand. Or, hold a rubber exercise band or tube in both hands, keeping your hands at the same level and hip distance apart. There should be a slight tension in the exercise band or tube.  3. Slowly curl the back of your hand up toward your forearm.  4. Hold this position for __________ seconds.  5. Slowly lower your hand to the starting position.  Repeat __________ times. Complete this exercise __________ times a day.  This information is not intended to replace advice given to you by your health care provider. Make sure you discuss any questions you have with your health care provider.  Document Released: 12/18/2006 Document Revised: 08/22/2017 Document Reviewed: 09/15/2016  Elsevier Interactive Patient Education © 2017 [employer]
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
## 2019-10-01 ENCOUNTER — OFFICE VISIT (OUTPATIENT)
Dept: MEDICAL GROUP | Facility: CLINIC | Age: 29
End: 2019-10-01
Payer: COMMERCIAL

## 2019-10-01 VITALS
TEMPERATURE: 98.8 F | HEIGHT: 72 IN | SYSTOLIC BLOOD PRESSURE: 122 MMHG | HEART RATE: 78 BPM | DIASTOLIC BLOOD PRESSURE: 78 MMHG | WEIGHT: 198.8 LBS | OXYGEN SATURATION: 98 % | RESPIRATION RATE: 16 BRPM | BODY MASS INDEX: 26.93 KG/M2

## 2019-10-01 DIAGNOSIS — S52.124A CLOSED NONDISPLACED FRACTURE OF HEAD OF RIGHT RADIUS, INITIAL ENCOUNTER: ICD-10-CM

## 2019-10-01 PROCEDURE — 24650 CLTX RDL HEAD/NCK FX WO MNPJ: CPT | Mod: RT | Performed by: FAMILY MEDICINE

## 2019-10-01 ASSESSMENT — ENCOUNTER SYMPTOMS
FEVER: 0
DIZZINESS: 0
CHILLS: 0
VOMITING: 0
NAUSEA: 0
SHORTNESS OF BREATH: 0
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
## 2019-12-11 ENCOUNTER — HOSPITAL ENCOUNTER (OUTPATIENT)
Facility: MEDICAL CENTER | Age: 29
End: 2019-12-11
Attending: PHYSICIAN ASSISTANT
Payer: COMMERCIAL

## 2019-12-11 ENCOUNTER — HOSPITAL ENCOUNTER (OUTPATIENT)
Dept: LAB | Facility: MEDICAL CENTER | Age: 29
End: 2019-12-11
Attending: PHYSICIAN ASSISTANT
Payer: COMMERCIAL

## 2019-12-11 ENCOUNTER — OFFICE VISIT (OUTPATIENT)
Dept: URGENT CARE | Facility: PHYSICIAN GROUP | Age: 29
End: 2019-12-11
Payer: COMMERCIAL

## 2019-12-11 VITALS
DIASTOLIC BLOOD PRESSURE: 60 MMHG | HEIGHT: 72 IN | BODY MASS INDEX: 26.41 KG/M2 | HEART RATE: 58 BPM | WEIGHT: 195 LBS | SYSTOLIC BLOOD PRESSURE: 122 MMHG | TEMPERATURE: 98 F | OXYGEN SATURATION: 99 %

## 2019-12-11 DIAGNOSIS — Z20.2 POSSIBLE EXPOSURE TO STD: ICD-10-CM

## 2019-12-11 DIAGNOSIS — N48.89 PENILE IRRITATION: ICD-10-CM

## 2019-12-11 DIAGNOSIS — L42 PITYRIASIS ROSEA: ICD-10-CM

## 2019-12-11 LAB
APPEARANCE UR: NORMAL
BILIRUB UR STRIP-MCNC: NEGATIVE MG/DL
COLOR UR AUTO: YELLOW
GLUCOSE UR STRIP.AUTO-MCNC: NEGATIVE MG/DL
HAV IGM SERPL QL IA: NEGATIVE
HBV CORE IGM SER QL: NEGATIVE
HBV SURFACE AG SER QL: NEGATIVE
HCV AB SER QL: NEGATIVE
HIV 1+2 AB+HIV1 P24 AG SERPL QL IA: NON REACTIVE
KETONES UR STRIP.AUTO-MCNC: NEGATIVE MG/DL
LEUKOCYTE ESTERASE UR QL STRIP.AUTO: NORMAL
NITRITE UR QL STRIP.AUTO: NEGATIVE
PH UR STRIP.AUTO: 7 [PH] (ref 5–8)
PROT UR QL STRIP: NEGATIVE MG/DL
RBC UR QL AUTO: NEGATIVE
SP GR UR STRIP.AUTO: 1.01
TREPONEMA PALLIDUM IGG+IGM AB [PRESENCE] IN SERUM OR PLASMA BY IMMUNOASSAY: NON REACTIVE
UROBILINOGEN UR STRIP-MCNC: 0.2 MG/DL

## 2019-12-11 PROCEDURE — 87389 HIV-1 AG W/HIV-1&-2 AB AG IA: CPT

## 2019-12-11 PROCEDURE — 81002 URINALYSIS NONAUTO W/O SCOPE: CPT | Performed by: PHYSICIAN ASSISTANT

## 2019-12-11 PROCEDURE — 86780 TREPONEMA PALLIDUM: CPT

## 2019-12-11 PROCEDURE — 87591 N.GONORRHOEAE DNA AMP PROB: CPT

## 2019-12-11 PROCEDURE — 80074 ACUTE HEPATITIS PANEL: CPT

## 2019-12-11 PROCEDURE — 99214 OFFICE O/P EST MOD 30 MIN: CPT | Performed by: PHYSICIAN ASSISTANT

## 2019-12-11 PROCEDURE — 87491 CHLMYD TRACH DNA AMP PROBE: CPT

## 2019-12-11 PROCEDURE — 36415 COLL VENOUS BLD VENIPUNCTURE: CPT

## 2019-12-11 RX ORDER — AZITHROMYCIN 500 MG/1
1000 TABLET, FILM COATED ORAL ONCE
Qty: 2 TAB | Refills: 0 | Status: SHIPPED | OUTPATIENT
Start: 2019-12-11 | End: 2019-12-11

## 2019-12-11 ASSESSMENT — ENCOUNTER SYMPTOMS
FLANK PAIN: 0
FEVER: 0

## 2019-12-11 NOTE — PROGRESS NOTES
Subjective:      Devon Pozo is a 29 y.o. male who presents with Exposure to STD (itching, rash [id number])            Patient is a 29-year-old male who presents to urgent care with diffuse rash for the last month.  Patient does report history of pityriasis rosea which this feels very similar.  He does report a lesion to his abdomen that started approximately a week prior to the other rash.  He does report it is pruritic in nature.  He also wanted evaluation today as he recently had unprotected sex approximately 5 days ago when yesterday he developed dysuria and penile irritation.  He denies lesion to the shaft of his penis or genitalia rash.  He does report history of chlamydia which this feels very similar.  He does report that this is a new sexual partner.  He is also requesting blood work for other STI.  Patient is requesting treatment today as he is leaving for [place] tomorrow.    Exposure to STD   This is a new problem. The current episode started in the past 7 days. The problem occurs constantly. The problem has been unchanged. Associated symptoms include a rash and urinary symptoms. Pertinent negatives include no fever. Nothing aggravates the symptoms. He has tried nothing for the symptoms.       Review of Systems   Constitutional: Negative for fever.   Genitourinary: Positive for dysuria. Negative for flank pain, frequency, hematuria and urgency.   Skin: Positive for itching and rash.   All other systems reviewed and are negative.         Objective:     /60   Pulse (!) 58   Temp 36.7 °C (98 °F) (Temporal)   Ht 1.829 m (6')   Wt 88.5 kg (195 lb)   SpO2 99%   BMI 26.45 kg/m²    PMH:  has a past medical history of ADHD, Anxiety, Depression, History of palpitations, and Insomnia.  MEDS: Reviewed .   ALLERGIES: No Known Allergies  SURGHX:   Past Surgical History:   Procedure Laterality Date   • OPEN REDUCTION      lefT clavicle   • THYROGLOSSAL DUCT EXCISION       SOCHX:  reports that he quit smoking  about 6 years ago. His smoking use included cigarettes. He smoked 0.25 packs per day. He quit smokeless tobacco use about 5 years ago. He reports current alcohol use of about 1.8 oz of alcohol per week. He reports current drug use. Drug: Marijuana.  FH: Family history was reviewed, no pertinent findings to report    Physical Exam  Vitals signs reviewed.   Constitutional:       General: He is not in acute distress.     Appearance: He is well-developed.   HENT:      Head: Normocephalic and atraumatic.   Eyes:      Conjunctiva/sclera: Conjunctivae normal.      Pupils: Pupils are equal, round, and reactive to light.   Neck:      Musculoskeletal: Normal range of motion and neck supple.      Trachea: No tracheal deviation.   Cardiovascular:      Rate and Rhythm: Normal rate and regular rhythm.   Pulmonary:      Effort: Pulmonary effort is normal. No respiratory distress.   Musculoskeletal: Normal range of motion.   Skin:     General: Skin is warm.      Findings: Rash present.             Comments: Diffuse scaly erythematous pruritic rash to the trunk, abdomen and extremities.  Spares the palms and the soles of feet.   Neurological:      Mental Status: He is alert and oriented to person, place, and time.   Psychiatric:         Behavior: Behavior normal.         Thought Content: Thought content normal.         Judgment: Judgment normal.                 Assessment/Plan:       1. Penile irritation  - HIV AG/AB COMBO ASSAY SCREENING; Future  - T.PALLIDUM AB EIA; Future  - HEPATITIS PANEL ACUTE(4 COMPONENTS); Future  - POCT Urinalysis  - CHLAMYDIA/GC PCR URINE OR SWAB; Future  - cefTRIAXone (ROCEPHIN) 250 mg, lidocaine (XYLOCAINE) 1 % 0.9 mL for IM use  - azithromycin (ZITHROMAX) 500 MG tablet; Take 2 Tabs by mouth Once for 1 dose.  Dispense: 2 Tab; Refill: 0    2. Possible exposure to STD  - HIV AG/AB COMBO ASSAY SCREENING; Future  - T.PALLIDUM AB EIA; Future  - HEPATITIS PANEL ACUTE(4 COMPONENTS); Future  - POCT Urinalysis  -  CHLAMYDIA/GC PCR URINE OR SWAB; Future  - cefTRIAXone (ROCEPHIN) 250 mg, lidocaine (XYLOCAINE) 1 % 0.9 mL for IM use  - azithromycin (ZITHROMAX) 500 MG tablet; Take 2 Tabs by mouth Once for 1 dose.  Dispense: 2 Tab; Refill: 0    3. Pityriasis rosea    Rash very consistent with pityriasis-discussed over-the-counter supportive therapy, avoidance of scratching.  Discussed the self-limiting nature of such.  Will sign off for the above and will provide treatment in clinic.  Patient's partner is currently being evaluated as well.  Patient is to abstain from sexual activity while on vacation.  I will follow-up with this patient as results return.  Strongly encourage condom use in the future.  Patient given precautionary s/sx that mandate immediate follow up and evaluation in the ED. Advised of risks of not doing so.    DDX, Supportive care, and indications for immediate follow-up discussed with patient.    Instructed to return to clinic or nearest emergency department if we are not available for any change in condition, further concerns, or worsening of symptoms.    The patient demonstrated a good understanding and agreed with the treatment plan.  Please note that this dictation was created using voice recognition software. I have made every reasonable attempt to correct obvious errors, but I expect that there are errors of grammar and possibly content that I did not discover before finalizing the note.

## 2019-12-13 LAB
C TRACH DNA SPEC QL NAA+PROBE: NEGATIVE
N GONORRHOEA DNA SPEC QL NAA+PROBE: NEGATIVE
SPECIMEN SOURCE: NORMAL

## 2020-01-02 ENCOUNTER — OFFICE VISIT (OUTPATIENT)
Dept: URGENT CARE | Facility: PHYSICIAN GROUP | Age: 30
End: 2020-01-02
Payer: COMMERCIAL

## 2020-01-02 ENCOUNTER — HOSPITAL ENCOUNTER (OUTPATIENT)
Facility: MEDICAL CENTER | Age: 30
End: 2020-01-02
Attending: FAMILY MEDICINE
Payer: COMMERCIAL

## 2020-01-02 VITALS
DIASTOLIC BLOOD PRESSURE: 70 MMHG | WEIGHT: 204 LBS | HEIGHT: 73 IN | HEART RATE: 59 BPM | OXYGEN SATURATION: 97 % | BODY MASS INDEX: 27.04 KG/M2 | SYSTOLIC BLOOD PRESSURE: 106 MMHG | TEMPERATURE: 98.1 F | RESPIRATION RATE: 14 BRPM

## 2020-01-02 DIAGNOSIS — M25.562 PATELLOFEMORAL ARTHRALGIA OF LEFT KNEE: ICD-10-CM

## 2020-01-02 DIAGNOSIS — L21.0 PITYRIASIS: ICD-10-CM

## 2020-01-02 DIAGNOSIS — Z72.51 UNPROTECTED SEX: ICD-10-CM

## 2020-01-02 PROCEDURE — 87591 N.GONORRHOEAE DNA AMP PROB: CPT

## 2020-01-02 PROCEDURE — 99214 OFFICE O/P EST MOD 30 MIN: CPT | Performed by: FAMILY MEDICINE

## 2020-01-02 PROCEDURE — 87491 CHLMYD TRACH DNA AMP PROBE: CPT

## 2020-01-02 RX ORDER — METHYLPREDNISOLONE 4 MG/1
TABLET ORAL
Qty: 21 TAB | Refills: 0 | Status: SHIPPED | OUTPATIENT
Start: 2020-01-02 | End: 2021-10-16

## 2020-01-02 SDOH — HEALTH STABILITY: MENTAL HEALTH: HOW OFTEN DO YOU HAVE A DRINK CONTAINING ALCOHOL?: 4 OR MORE TIMES A WEEK

## 2020-01-02 SDOH — HEALTH STABILITY: MENTAL HEALTH: HOW MANY STANDARD DRINKS CONTAINING ALCOHOL DO YOU HAVE ON A TYPICAL DAY?: 1 OR 2

## 2020-01-09 ASSESSMENT — ENCOUNTER SYMPTOMS
JOINT SWELLING: 0
NAIL CHANGES: 0
COUGH: 0
FEVER: 0

## 2020-01-09 NOTE — PROGRESS NOTES
"Subjective:   Tomas Pozo  is a 29 y.o. male who presents for Knee Pain (L knee pain x4 days); Rash (torso and arms x2 months); and Sexually Transmitted Diseases (Std re-check )        Knee Pain   This is a new problem. The current episode started in the past 7 days. The problem occurs intermittently. The problem has been waxing and waning. Associated symptoms include a rash. Pertinent negatives include no congestion, coughing, fever or joint swelling.   Rash   This is a new problem. The current episode started more than 1 month ago. The problem has been gradually worsening since onset. The rash is diffuse. Pertinent negatives include no congestion, cough, fever or nail changes.   Sexually Transmitted Diseases   This is a new problem. The current episode started 1 to 4 weeks ago. The problem occurs constantly. The problem has been unchanged. Associated symptoms include a rash. Pertinent negatives include no congestion, coughing, fever or joint swelling.     Review of Systems   Constitutional: Negative for fever.   HENT: Negative for congestion.    Respiratory: Negative for cough.    Musculoskeletal: Negative for joint swelling.   Skin: Positive for rash. Negative for nail changes.     No Known Allergies   Objective:   /70 (BP Location: Left arm, Patient Position: Sitting, BP Cuff Size: Large adult)   Pulse (!) 59   Temp 36.7 °C (98.1 °F) (Temporal)   Resp 14   Ht 1.854 m (6' 1\")   Wt 92.5 kg (204 lb)   SpO2 97%   BMI 26.91 kg/m²   Physical Exam  Constitutional:       General: He is not in acute distress.     Appearance: He is well-developed.   HENT:      Head: Normocephalic and atraumatic.   Eyes:      Conjunctiva/sclera: Conjunctivae normal.      Pupils: Pupils are equal, round, and reactive to light.   Cardiovascular:      Rate and Rhythm: Normal rate and regular rhythm.      Heart sounds: No murmur.   Pulmonary:      Effort: Pulmonary effort is normal. No respiratory distress.      Breath sounds: " Normal breath sounds.   Abdominal:      General: There is no distension.      Palpations: Abdomen is soft.      Tenderness: There is no tenderness.   Musculoskeletal:      Left knee: He exhibits abnormal patellar mobility. He exhibits normal range of motion, no swelling and no effusion.   Skin:     General: Skin is warm and dry.      Findings: Rash present. Rash is macular and papular.   Neurological:      Mental Status: He is alert and oriented to person, place, and time.      Sensory: No sensory deficit.      Deep Tendon Reflexes: Reflexes are normal and symmetric.      exam deferred.       Assessment/Plan:   1. Pityriasis  - methylPREDNISolone (MEDROL DOSEPAK) 4 MG Tablet Therapy Pack; Follow schedule on package instructions.  Dispense: 21 Tab; Refill: 0    2. Patellofemoral arthralgia of left knee    3. Unprotected sex  - CHLAMYDIA/GC PCR URINE OR SWAB; Future    Differential diagnosis, natural history, supportive care, and indications for immediate follow-up discussed.

## 2020-04-27 ENCOUNTER — TELEMEDICINE (OUTPATIENT)
Dept: TELEHEALTH | Facility: TELEMEDICINE | Age: 30
End: 2020-04-27
Payer: COMMERCIAL

## 2020-04-27 DIAGNOSIS — L30.9 CHRONIC DERMATITIS: ICD-10-CM

## 2020-04-27 PROCEDURE — 99213 OFFICE O/P EST LOW 20 MIN: CPT | Mod: 95,CR | Performed by: NURSE PRACTITIONER

## 2020-04-27 NOTE — PROGRESS NOTES
Telemedicine Visit: Established Patient     This encounter was conducted via Zoom .   Verbal consent was obtained. Patient's identity was verified.    Subjective:   CC: Chronic rash  Tomas Pozo is a 29 y.o. male presenting for evaluation and management of:    Patient is a 29-year-old male who presents today with complaint of chronic rash.  He has been seen twice previously in urgent care for the same complaint.  He has tried a Medrol Dosepak a little over 1 month ago for relief of symptoms.  His initial diagnosis for this rash was made in December 2019, patient was diagnosed with pityriasis rosea.  Patient received prescription for prednisone which he reports was not helpful.  Rash has been ongoing for 16 to 20 weeks at this time and has not resolved.  He states it is not spreading, but it just is not going away either.  No itching.  He denies any other symptoms such as fever, body aches, or chills.    ROS   Denies any recent fevers or chills. No nausea or vomiting. No chest pains or shortness of breath.     No Known Allergies    Current medicines (including changes today)  Current Outpatient Medications   Medication Sig Dispense Refill   • methylPREDNISolone (MEDROL DOSEPAK) 4 MG Tablet Therapy Pack Follow schedule on package instructions. 21 Tab 0   • lamotrigine (LAMICTAL) 200 MG tablet Take 200 mg by mouth.  0   • Multiple Vitamins-Minerals (MULTIVITAMIN PO) Take  by mouth.     • Omega-3 Fatty Acids (FISH OIL) 1000 MG Cap capsule Take 1,000 mg by mouth 3 times a day, with meals.       No current facility-administered medications for this visit.        Patient Active Problem List    Diagnosis Date Noted   • Numbness and tingling of left hand 06/19/2019   • Bipolar disorder, in partial remission, most recent episode mixed (HCC) 06/19/2019   • Anxiety and depression 10/29/2018   • Neck pain, chronic 02/06/2018   • Attention deficit hyperactivity disorder (ADHD), predominantly inattentive type 02/08/2017   •  Primary insomnia 02/08/2017       Family History   Problem Relation Age of Onset   • No Known Problems Mother    • Hypertension Father    • Psychiatric Illness Sister        He  has a past medical history of ADHD, Anxiety, Depression, History of palpitations, and Insomnia.  He  has a past surgical history that includes thyroglossal duct excision and open reduction.       Objective:   Vitals obtained by patient:      Physical Exam:  Constitutional: Alert, no distress, well-groomed.  Skin: There is a scattered rash over the right and left side of the lower abdomen that is light pink to red and scaly no excoriation, no erythema or discharge or exudate noted  Eye: Round. Conjunctiva clear, lids normal. No icterus.   ENMT: Lips pink without lesions. Phonation normal.  Neck: No masses, no thyromegaly. Moves freely without pain.  CV: Pulse as reported by patient  Respiratory: Unlabored respiratory effort, no cough or audible wheeze  Psych: Alert and oriented x3, normal affect and mood.       Assessment and Plan:   The following treatment plan was discussed:     1. Chronic dermatitis    May take over-the-counter antihistamine of choice  Referral given to dermatology  Return to  for any further questions or concerns    Follow-up: Referral given to dermatology for follow up.

## 2020-04-30 ENCOUNTER — TELEMEDICINE (OUTPATIENT)
Dept: DERMATOLOGY | Facility: IMAGING CENTER | Age: 30
End: 2020-04-30
Payer: COMMERCIAL

## 2020-04-30 DIAGNOSIS — R21 RASH: ICD-10-CM

## 2020-04-30 PROBLEM — R20.0 NUMBNESS AND TINGLING OF LEFT HAND: Status: RESOLVED | Noted: 2019-06-19 | Resolved: 2020-04-30

## 2020-04-30 PROBLEM — R20.2 NUMBNESS AND TINGLING OF LEFT HAND: Status: RESOLVED | Noted: 2019-06-19 | Resolved: 2020-04-30

## 2020-04-30 PROCEDURE — 99202 OFFICE O/P NEW SF 15 MIN: CPT | Mod: 95,CR | Performed by: DERMATOLOGY

## 2020-04-30 NOTE — PROGRESS NOTES
CC: chronic rash    As a means of avoiding spread of COVID-19, this visit is being conducted by video. This video visit was initiated by the patient and they verbally consented to Zoom    Subjective: new patient here for rash X months.     Reports rash starting in Nov 2019 - on back/sides and stomach and arm.  Arm has improved, but still has rash noted on core/abdomen.    Improving, but not gone.  Little itching.     No preceding illness/sickness in Nov 2019.  Was instructed it might be MI.  Had had it 10 years ago, similar but this case is lasting longer.  Doesn't appear to be the same.     Denies preceding oral/ ulcers/sores, but hx of recurrent throat soreness.  Hx of chlamydia     ROS: no fevers/chills. +/- itch.    DermPMH: no skin cancer/melanoma  No problem-specific Assessment & Plan notes found for this encounter.    Relevant PMH: ADHD, anxiety/depression  Social: former smoker; mult sexual partners    PE: Gen:WDWN male in NAD.  Exam - somewhat limited by teletechnology-video.    Skin: face/eyes/lips - not appearing affected.  Torso/abdomen/body/thorax - multiple pink scaly papules, few plaques.  Denies palms/soles affected    A/P: papulosquamous rash:  -consider broad differential: PSO/MI/LP/secondary syphilis/CTCL/others  -counseled re: dx/trx limitations by telederm  -rec blood testing for STIs based on risks  -consider oral pharyngitis as possible sx of strep pharyngitis and subsequent guttate/small plaque PSO - ordered ASO  -lidex cream BID X 2-3 weeks  -rec RTC 2-3 weeks for punch biopsy    Rec throat culture in clinic - G/C?Strep??    I have reviewed medications relevant to my specialty.

## 2020-05-21 ENCOUNTER — OFFICE VISIT (OUTPATIENT)
Dept: DERMATOLOGY | Facility: IMAGING CENTER | Age: 30
End: 2020-05-21
Payer: COMMERCIAL

## 2020-05-21 DIAGNOSIS — R21 RASH: ICD-10-CM

## 2020-05-21 PROCEDURE — 99213 OFFICE O/P EST LOW 20 MIN: CPT | Performed by: DERMATOLOGY

## 2020-05-21 NOTE — PROGRESS NOTES
"CC: chronic rash    Subjective: prev seen patient here for rash X months, prev seen by tel[facility]erm.     Reports rash has improved. Less notable. Fading all over. No new sites. Topical steroid is working well.  May be some background dryness of skin with its use.   No labs in interim.     Per prior note:  \"Reports rash starting in Nov 2019 - on back/sides and stomach and arm.  Arm has improved, but still has rash noted on core/abdomen.    Improving, but not gone.  Little itching.     No preceding illness/sickness in Nov 2019.  Was instructed it might be NV.  Had had it 10 years ago, similar but this case is lasting longer.  Doesn't appear to be the same.     Denies preceding oral/ ulcers/sores, but hx of recurrent throat soreness.  Hx of chlamydia \"    ROS: no fevers/chills.  no itch.  No cough  DermPMH: no skin cancer/melanoma  No problem-specific Assessment & Plan notes found for this encounter.    Relevant PMH: ADHD, anxiety/depression  Social: former smoker; mult sexual partners    PE: Gen:WDWN male in NAD.  Exam: face/eyes/neck - spared.   Torso/abdomen- few subtle pink slightly scaly very thin papules/macules. Xerosis of background skin.    A/P: papulosquamous rash: resolving/nearly improved.  -consider broad differential: NV likely  -rec blood testing for STIs based on risks  -cont lidex cream BID-->prn , then stop    F/u PRN  I have reviewed medications relevant to my specialty.            "

## 2020-11-05 ENCOUNTER — HOSPITAL ENCOUNTER (OUTPATIENT)
Dept: LAB | Facility: MEDICAL CENTER | Age: 30
End: 2020-11-05
Attending: DERMATOLOGY
Payer: COMMERCIAL

## 2020-11-05 DIAGNOSIS — R21 RASH: ICD-10-CM

## 2020-11-05 LAB
HCV AB SER QL: NORMAL
HIV 1+2 AB+HIV1 P24 AG SERPL QL IA: NORMAL
TREPONEMA PALLIDUM IGG+IGM AB [PRESENCE] IN SERUM OR PLASMA BY IMMUNOASSAY: NORMAL

## 2020-11-05 PROCEDURE — 87529 HSV DNA AMP PROBE: CPT

## 2020-11-05 PROCEDURE — 86780 TREPONEMA PALLIDUM: CPT

## 2020-11-05 PROCEDURE — 36415 COLL VENOUS BLD VENIPUNCTURE: CPT

## 2020-11-05 PROCEDURE — 86060 ANTISTREPTOLYSIN O TITER: CPT

## 2020-11-05 PROCEDURE — 87491 CHLMYD TRACH DNA AMP PROBE: CPT

## 2020-11-05 PROCEDURE — 87389 HIV-1 AG W/HIV-1&-2 AB AG IA: CPT

## 2020-11-05 PROCEDURE — 86803 HEPATITIS C AB TEST: CPT

## 2020-11-05 PROCEDURE — 87591 N.GONORRHOEAE DNA AMP PROB: CPT

## 2020-11-08 LAB — ASO AB SERPL-ACNC: 88 IU/ML (ref 0–330)

## 2020-11-09 LAB
HSV DNA SPEC QL NAA+PROBE: NOT DETECTED
SPECIMEN SOURCE: NORMAL

## 2020-12-08 ENCOUNTER — OFFICE VISIT (OUTPATIENT)
Dept: URGENT CARE | Facility: PHYSICIAN GROUP | Age: 30
End: 2020-12-08
Payer: COMMERCIAL

## 2020-12-08 VITALS
RESPIRATION RATE: 16 BRPM | TEMPERATURE: 98 F | SYSTOLIC BLOOD PRESSURE: 112 MMHG | DIASTOLIC BLOOD PRESSURE: 78 MMHG | BODY MASS INDEX: 26.9 KG/M2 | WEIGHT: 203 LBS | OXYGEN SATURATION: 97 % | HEART RATE: 57 BPM | HEIGHT: 73 IN

## 2020-12-08 DIAGNOSIS — M22.2X2 PATELLOFEMORAL DISORDER OF LEFT KNEE: ICD-10-CM

## 2020-12-08 PROCEDURE — 99214 OFFICE O/P EST MOD 30 MIN: CPT | Performed by: PHYSICIAN ASSISTANT

## 2020-12-08 ASSESSMENT — ENCOUNTER SYMPTOMS
DIARRHEA: 0
CHILLS: 0
COUGH: 0
CONSTIPATION: 0
SORE THROAT: 0
MYALGIAS: 0
HEADACHES: 0
FEVER: 0
EYE PAIN: 0
ABDOMINAL PAIN: 0
NAUSEA: 0
VOMITING: 0
SHORTNESS OF BREATH: 0

## 2020-12-08 ASSESSMENT — PAIN SCALES - GENERAL: PAINLEVEL: NO PAIN

## 2020-12-09 NOTE — PROGRESS NOTES
Subjective:   Tomas Pozo is a 30 y.o. male who presents for Knee Pain (L knee pain after running x1days )      HPI:  This is a pleasant and otherwise healthy 30-year-old male who presents complaining of some nagging left knee pain for several months that been more bothersome over the last 2 to 3 days.  He went for a run yesterday and noticed more anterior knee pain and inflammation and some swelling after his run.  He notes a little bit of a clicking sensation located on the anterior surface of the knee.  He denies any knee instability.  No known knee injury or history of surgery of the affected knee.    Review of Systems   Constitutional: Negative for chills, fever and malaise/fatigue.   HENT: Negative for congestion, ear pain and sore throat.    Eyes: Negative for pain.   Respiratory: Negative for cough and shortness of breath.    Cardiovascular: Negative for chest pain.   Gastrointestinal: Negative for abdominal pain, constipation, diarrhea, nausea and vomiting.   Genitourinary: Negative for dysuria.   Musculoskeletal: Negative for myalgias.   Skin: Negative for rash.   Neurological: Negative for headaches.       Medications:    • fish oil Caps  • fluocinonide Crea  • lamotrigine  • methylPREDNISolone Tbpk  • MULTIVITAMIN PO    Allergies: Patient has no known allergies.    Problem List: Tomas Pozo has Attention deficit hyperactivity disorder (ADHD), predominantly inattentive type; Primary insomnia; Neck pain, chronic; Anxiety and depression; and Bipolar disorder, in partial remission, most recent episode mixed (HCC) on their problem list.    Surgical History:  Past Surgical History:   Procedure Laterality Date   • OPEN REDUCTION      lefT clavicle   • THYROGLOSSAL DUCT EXCISION         Past Social Hx: Tomas Pozo  reports that he quit smoking about 7 years ago. His smoking use included cigarettes. He smoked 0.25 packs per day. He quit smokeless tobacco use about 6 years ago. He reports current alcohol use  "of about 1.8 oz of alcohol per week. He reports current drug use. Drug: Marijuana.     Past Family Hx:  Tomas Pozo family history includes Hypertension in his father; No Known Problems in his mother; Psychiatric Illness in his sister.     Problem list, medications, and allergies reviewed by myself today in Epic.     Objective:     /78   Pulse (!) 57   Temp 36.7 °C (98 °F) (Temporal)   Resp 16   Ht 1.854 m (6' 1\")   Wt 92.1 kg (203 lb)   SpO2 97%   BMI 26.78 kg/m²     Physical Exam  Vitals signs reviewed.   Constitutional:       Appearance: Normal appearance.   HENT:      Head: Normocephalic and atraumatic.      Right Ear: External ear normal.      Left Ear: External ear normal.      Nose: Nose normal.      Mouth/Throat:      Mouth: Mucous membranes are moist.   Eyes:      Conjunctiva/sclera: Conjunctivae normal.   Cardiovascular:      Rate and Rhythm: Normal rate.   Pulmonary:      Effort: Pulmonary effort is normal.   Musculoskeletal:      Comments: Trace ballottement of the patella of the left knee.  No ecchymosis or deformity, no joint line tenderness.  5 out of 5 strength in all planes.  No ligamentous laxity with specialized testing, negative Lina's, negative Lachman, negative posterior laxity.  There is decreased hip abduction strength on the left compared to right side    Skin:     General: Skin is warm and dry.      Capillary Refill: Capillary refill takes less than 2 seconds.   Neurological:      Mental Status: He is alert and oriented to person, place, and time.         Assessment/Plan:     Diagnosis and associated orders:     1. Patellofemoral disorder of left knee        Comments/MDM:     • No indication for imaging at this point.  I will put in referral for sports medicine so the patient can be followed.  He is a young athlete and had this pain is starting to interfere with his activities.  His muscular imbalance and exam are consistent with patellofemoral tracking disorder.  I " reviewed with him some exercises at home that focus on hip abduction as well as strengthening of the muscles of the gluteus and recommend he try these until he can follow-up with sports medicine.         Differential diagnosis, natural history, supportive care, and indications for immediate follow-up discussed.    Advised the patient to follow-up with the primary care physician for recheck, reevaluation, and consideration of further management.    Please note that this dictation was created using voice recognition software. I have made a reasonable attempt to correct obvious errors, but I expect that there are errors of grammar and possibly content that I did not discover before finalizing the note.    This note was electronically signed by Jaime Mejia PA-C

## 2020-12-15 ENCOUNTER — OFFICE VISIT (OUTPATIENT)
Dept: MEDICAL GROUP | Facility: CLINIC | Age: 30
End: 2020-12-15
Payer: COMMERCIAL

## 2020-12-15 ENCOUNTER — APPOINTMENT (OUTPATIENT)
Dept: RADIOLOGY | Facility: IMAGING CENTER | Age: 30
End: 2020-12-15
Attending: FAMILY MEDICINE
Payer: COMMERCIAL

## 2020-12-15 VITALS
DIASTOLIC BLOOD PRESSURE: 72 MMHG | RESPIRATION RATE: 14 BRPM | HEART RATE: 66 BPM | HEIGHT: 73 IN | WEIGHT: 203 LBS | SYSTOLIC BLOOD PRESSURE: 110 MMHG | BODY MASS INDEX: 26.9 KG/M2 | OXYGEN SATURATION: 98 % | TEMPERATURE: 98.1 F

## 2020-12-15 DIAGNOSIS — M22.2X2 PATELLOFEMORAL DISORDER, LEFT: ICD-10-CM

## 2020-12-15 PROCEDURE — 99213 OFFICE O/P EST LOW 20 MIN: CPT | Performed by: FAMILY MEDICINE

## 2020-12-15 PROCEDURE — 73564 X-RAY EXAM KNEE 4 OR MORE: CPT | Mod: TC,LT | Performed by: FAMILY MEDICINE

## 2020-12-15 NOTE — PROGRESS NOTES
CHIEF COMPLAINT:  Tomas Pozo male presenting at the request of Jaime Mejia PA-C for evaluation of knee pain.     Tomas Pozo is complaining of left knee pain  Episode 2 years ago started the issue  Pain is deep in the knee joint  Quality is aching, sharp  Pain is non-radiating   Improved with resting  Aggravated by stairs, particularly down, or walking/running downhill  Also aggravated more by pivoting and feels better when he walks straight  no prior problems with this area in the past   Prior Treatments: seen at   Prior studies: NO Prior imaging has been done   Medications tried for pain include: none  Mechanical Symptom history: No Locking and clicking which is not necessarily painful    17 mile hike, on a downhill, jumped onto a large [place]  Does not recall a pop at the time of injury, but he experienced sudden sharp pain  Then the rest of his hike he was limping  Pain has been waxing and waning since then, activity dependent    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 No pertinent past medical history.    PMH:  has a past medical history of ADHD, Anxiety, Depression, History of palpitations, and Insomnia.  MEDS:   Current Outpatient Medications:   •  Multiple Vitamins-Minerals (MULTIVITAMIN PO), Take  by mouth., Disp: , Rfl:   •  Omega-3 Fatty Acids (FISH OIL) 1000 MG Cap capsule, Take 1,000 mg by mouth 3 times a day, with meals., Disp: , Rfl:   •  fluocinonide (LIDEX) 0.05 % Cream, Apply 1 Application to affected area(s) 2 times a day. Use on back/arms/legs BID for rash.  Do not use on face, axilla, groin. (Patient not taking: Reported on 12/8/2020), Disp: 120 g, Rfl: 1  •  methylPREDNISolone (MEDROL DOSEPAK) 4 MG Tablet Therapy Pack, Follow schedule on package instructions. (Patient not taking: Reported  "on 12/8/2020), Disp: 21 Tab, Rfl: 0  •  lamotrigine (LAMICTAL) 200 MG tablet, Take 200 mg by mouth., Disp: , Rfl: 0  ALLERGIES: No Known Allergies  SURGHX:   Past Surgical History:   Procedure Laterality Date   • OPEN REDUCTION      lefT clavicle   • THYROGLOSSAL DUCT EXCISION       SOCHX:  reports that he quit smoking about 7 years ago. His smoking use included cigarettes. He smoked 0.25 packs per day. He quit smokeless tobacco use about 6 years ago. He reports current alcohol use of about 1.8 oz of alcohol per week. He reports current drug use. Drug: Marijuana.  FH: Family history was reviewed, no pertinent findings to report     PHYSICAL EXAM:  /72 (BP Location: Left arm, Patient Position: Sitting, BP Cuff Size: Adult)   Pulse 66   Temp 36.7 °C (98.1 °F) (Temporal)   Resp 14   Ht 1.854 m (6' 1\")   Wt 92.1 kg (203 lb)   SpO2 98%   BMI 26.78 kg/m²      well-developed, well-nourished in no apparent distress, alert and oriented x 3.  Gait: normal     RIGHT Knee:  Slight Varus and No Swelling  Range of Motion Intact  Trace effusion  Patellar No tenderness and no apprehension  Medial Joint Line Non-tender and NEGATIVE Lina  Lateral Joint Line Non-tender and NEGATIVE Lina  Trace Laxity with Varus stress  Trace Laxity with Valgus stress  Lachman's testing is Trace  Posterior Drawer Testing is Trace  The leg is otherwise neurovascularly intact    LEFT Knee:  Slight Varus and No Swelling   Range of Motion Intact significant crepitus of the LEFT patellofemoral joint  Trace effusion  Patellar No tenderness and no apprehension  Medial Joint Line Non-tender and NEGATIVE Lina  Lateral Joint Line Non-tender and NEGATIVE Lina  Trace Laxity with Varus stress  Trace Laxity with Valgus stress  Lachman's testing is Trace  Posterior Drawer Testing is Trace  The leg is otherwise neurovascularly intact    Additional Findings: None and Tight hamstrings  More difficulty with single-leg squat mechanics on the " LEFT compared to the right    1. Patellofemoral disorder, left  DX-KNEE COMPLETE 4+ LEFT    REFERRAL TO PHYSICAL THERAPY     Episode 2 years ago started the issue  Pain is deep in the knee joint  Quality is aching, sharp  Pain is non-radiating     Patient has elected to proceed with formal physical therapy PT feliciano  He was fitted with a functional patellar stabilizer brace  HEP also provided    Return in about 6 weeks (around 1/26/2021).   To see how home exercise program, bracing and physical therapy are going        12/15/2020 10:06 AM     HISTORY/REASON FOR EXAM:  Left knee pain after hiking injury.        TECHNIQUE/EXAM DESCRIPTION AND NUMBER OF VIEWS:  4 views of the LEFT knee.     COMPARISON: None     FINDINGS:        There is no significant joint effusion.     There is no evidence of displaced  fracture or dislocation.     There is no significant degenerative change.     IMPRESSION:     1.  Unremarkable left knee series.    Interpreted in the office today with the patient    Thank you Jaime Mejia PA-C for allowing me to participate in caring for your patient.

## 2021-01-27 ENCOUNTER — PHYSICAL THERAPY (OUTPATIENT)
Dept: PHYSICAL THERAPY | Facility: REHABILITATION | Age: 31
End: 2021-01-27
Attending: FAMILY MEDICINE
Payer: COMMERCIAL

## 2021-01-27 DIAGNOSIS — M22.2X2 PATELLOFEMORAL DISORDER, LEFT: ICD-10-CM

## 2021-01-27 PROCEDURE — 97161 PT EVAL LOW COMPLEX 20 MIN: CPT

## 2021-01-27 PROCEDURE — 97110 THERAPEUTIC EXERCISES: CPT

## 2021-01-27 SDOH — ECONOMIC STABILITY: GENERAL: QUALITY OF LIFE: GOOD

## 2021-01-27 ASSESSMENT — ENCOUNTER SYMPTOMS
PAIN SCALE AT HIGHEST: 5
PAIN SCALE AT LOWEST: 0
PAIN SCALE: 0

## 2021-01-27 NOTE — OP THERAPY EVALUATION
Outpatient Physical Therapy  INITIAL EVALUATION    [facility] [place]  [address], [address]  [place] [address]  Phone:  [phone]  Fax:  [phone]    Date of Evaluation: 2021    Patient: Devon Pozo  YOB: 1990  MRN: [id number]     Referring Provider: Tej Richardson M.D.  [address] [place] [address]   Referring Diagnosis Patellofemoral disorder, left [M22.2X2]     Time Calculation    Start time: 830  Stop time: 930 Time Calculation (min): 60 minutes         Chief Complaint: Knee Problem    Visit Diagnoses     ICD-10-CM   1. Patellofemoral disorder, left  M22.2X2         Subjective:   History of Present Illness:     Date of onset:  2019  Quality of life:  Good  Prior level of function:  New onset knee pain getting worse over two years  Pain:     Current pain ratin    At best pain ratin    At worst pain ratin  Diagnostic Tests:     X-ray: abnormal    Activities of Daily Living:     Patient reported ADL status: Limited tolerance for long hikes limited   Pain with descending stairs  Limited ambulation tolerance  Patient Goals:     Patient goals for therapy:  Decreased pain, increased motion and increased strength    Patient is a 30 y.o. male that presents to therapy with L knee pain. States that symptoms were due to injury, slipped off some rocks. Reports the pain quality to be sharp, intermittent and are primarily under the L patellar tendon. Reports that symptoms now not changing. States that aggravating factors are long duration hiking, downhill.  States that easng factors are rest. Denies red flags.    Past Medical History:   Diagnosis Date   • ADHD    • Anxiety    • Depression    • History of palpitations    • Insomnia      Past Surgical History:   Procedure Laterality Date   • OPEN REDUCTION      lefT clavicle   • THYROGLOSSAL DUCT EXCISION       Social History     Tobacco Use   • Smoking status: Former Smoker     Packs/day:  0.25     Types: Cigarettes     Quit date: 2013     Years since quittin.9   • Smokeless tobacco: Former User     Quit date: 2014   Substance Use Topics   • Alcohol use: Yes     Alcohol/week: 1.8 oz     Types: 3 Cans of beer per week     Frequency: 4 or more times a week     Drinks per session: 1 or 2     Comment: weekends     Family and Occupational History     Socioeconomic History   • Marital status:      Spouse name: Not on file   • Number of children: Not on file   • Years of education: Not on file   • Highest education level: Not on file   Occupational History   • Not on file       Objective     Neurological Testing     Reflexes   Left   Patellar (L4): trace (1+)  Achilles (S1): trace (1+)  Ankle clonus reflex: negative  Babinski sign: negative    Right   Patellar (L4): trace (1+)  Achilles (S1): trace (1+)  Ankle clonus reflex: negative  Babinski sign: negative    Myotome testing   Lumbar (left)   L5 (great toe extension): 5  S1 (ankle plantar flexors): 5    Lumbar (right)   L5 (great toe extension): 4  S1 (ankle plantar flexors): 5    Active Range of Motion   Left Knee   Flexion: WFL  Extension: WFL    Right Knee   Flexion: WFL  Extension: WFL    Passive Range of Motion   Left Knee   Flexion: WFL  Extension: WFL    Right Knee   Flexion: WFL  Extension: WFL    Patellar Mobility   Left Knee Hypomobile in the left medial, left lateral, left superior and left inferior patellar tendon(s).     Right Knee Hypomobile in the medial, lateral, superior and inferior patellar tendon(s).     Strength:      Left Hip   Planes of Motion   Abduction: 4-  Adduction: 4+    Right Hip   Planes of Motion   Abduction: 4+  Adduction: 4+    Left Knee   Flexion: 4+    Right Knee   Flexion: 5    Tests     Left Knee   Negative anterior drawer, lateral Lina, medial Lina, patellar compression, posterior drawer, valgus stress test at 0 degrees, valgus stress test at 30 degrees, varus stress test at 0 degrees and  varus stress test at 30 degrees.     Additional Tests Details  Noted B navicular drop        Therapeutic Exercises (CPT 57941):       Therapeutic Exercise Summary: Access Code: [email or web address]       Exercises Sidelying Hip Abduction with Resistance at Ankle- 10 reps- 2 sets- 1-3x daily- 7x weekly   X Band Walk- 10 reps- 2 sets- 1x daily- 7x weekly   Bird Dog on Swiss Ball- 10 reps- 2 sets- 1x daily- 7x weekly         Time-based treatments/modalities:    Physical Therapy Timed Treatment Charges  Therapeutic exercise minutes (CPT 35413): 10 minutes      Assessment, Response and Plan:   Impairments: activity intolerance, impaired physical strength and pain with function    Assessment details:  Patient presents with signs and symptoms consistent with patellar femoral syndrome vs patellar tendon dysfunction. Patient limitations include weakness, decreased ROM, and pain. Patient will benefit from skilled therapy to improve the aforementioned deficits and decrease further functional decline.   Prognosis: fair    Goals:   Short Term Goals:   1) Patient's L hip abd will improve by a half muscle grade to facilitate improved LE control.  2) Patient's symptoms will improve to facilitate hiking >1 hour.  Short term goal time span:  2-4 weeks      Long Term Goals:    1) Patient's hip abd strength will improve to 5/5 to allow for improved LE control.  2) Patient will fill out LEFS and goal will be set  Long term goal time span:  6-8 weeks    Plan:   Therapy options:  Physical therapy treatment to continue  Planned therapy interventions:  E Stim Unattended (CPT 07630), Hot or Cold Pack Therapy (CPT 79934), Manual Therapy (CPT 72890), Neuromuscular Re-education (CPT 90255) and Therapeutic Exercise (CPT 05196)  Frequency:  2x week  Duration in weeks:  6  Discussed with:  Patient      Functional Assessment Used  PT Functional Assessment Tool Used: will fill out at next visit     Referring provider co-signature:  I have reviewed this plan of  care and my co-signature certifies the need for services.    Certification Period: 01/27/2021 to  03/10/21    Physician Signature: ________________________________ Date: ______________

## 2021-01-29 ENCOUNTER — PHYSICAL THERAPY (OUTPATIENT)
Dept: PHYSICAL THERAPY | Facility: REHABILITATION | Age: 31
End: 2021-01-29
Attending: FAMILY MEDICINE
Payer: COMMERCIAL

## 2021-01-29 DIAGNOSIS — M22.2X2 PATELLOFEMORAL DISORDER, LEFT: ICD-10-CM

## 2021-01-29 PROCEDURE — 97110 THERAPEUTIC EXERCISES: CPT

## 2021-01-29 NOTE — OP THERAPY DAILY TREATMENT
Outpatient Physical Therapy  DAILY TREATMENT     [facility] [place]  [address], [address]  [place] [address]  Phone:  [phone]  Fax:  [phone]    Date: 01/29/2021    Patient: Devon Pozo  YOB: 1990  MRN: [id number]     Time Calculation    Start time: 0900  Stop time: 0930 Time Calculation (min): 30 minutes         Chief Complaint: Back Problem    Visit #: 2    SUBJECTIVE:  Patient reports that his knee is doing well. Notes that he has not tested it.     OBJECTIVE:  Current objective measures:           Therapeutic Exercises (CPT 02381):     1. Shuttle, L8 DL with band    2. Bosu squats, x10    3. Standing on DD with band pulls, x4min      Therapeutic Exercise Summary: Access Code: [email or web address]       Exercises Sidelying Hip Abduction with Resistance at Ankle- 10 reps- 2 sets- 1-3x daily- 7x weekly   X Band Walk- 10 reps- 2 sets- 1x daily- 7x weekly   Bird Dog on Swiss Ball- 10 reps- 2 sets- 1x daily- 7x weekly   Bridge with Heels on Swiss Ball- 10 reps- 2 sets- 1x daily- 7x weekly   Stool Scoots- 1 reps- 1x daily- 7x weekly         Time-based treatments/modalities:    Physical Therapy Timed Treatment Charges  Therapeutic exercise minutes (CPT 95443): 30 minutes      Pain rating (1-10) before treatment:  0  Pain rating (1-10) after treatment:  0    ASSESSMENT:   Response to treatment: Patient will continue with current plan of care. Patient will continue to progress to SL stability activity.     PLAN/RECOMMENDATIONS:   Plan for treatment: therapy treatment to continue next visit.  Planned interventions for next visit: continue with current treatment.

## 2021-02-02 ENCOUNTER — PHYSICAL THERAPY (OUTPATIENT)
Dept: PHYSICAL THERAPY | Facility: REHABILITATION | Age: 31
End: 2021-02-02
Attending: FAMILY MEDICINE
Payer: COMMERCIAL

## 2021-02-02 DIAGNOSIS — M22.2X2 PATELLOFEMORAL DISORDER, LEFT: ICD-10-CM

## 2021-02-02 PROCEDURE — 97110 THERAPEUTIC EXERCISES: CPT

## 2021-02-02 NOTE — OP THERAPY DAILY TREATMENT
Outpatient Physical Therapy  DAILY TREATMENT     [facility] [place]  [address]  [place] [address]  Phone:  [phone]  Fax:  [phone]    Date: 02/02/2021    Patient: Devon Pozo  YOB: 1990  MRN: [id number]     Time Calculation    Start time: 0900  Stop time: 0930 Time Calculation (min): 30 minutes         Chief Complaint: Knee Problem    Visit #: 3    SUBJECTIVE:  Patient reports that he is still feeling fine.     OBJECTIVE:  Current objective measures:             Therapeutic Exercises (CPT 51596):     1. Shuttle, L8 DL with band    2. Bosu squats, x10    3. Standing on DD with band pulls, x4min    4. Bike L20, x3min    5. Stair training, x20    6. Xband walks, 2x fatigue    7. Contralatral superman, x3min    8. Standing on DD with ball toss, x4min      Therapeutic Exercise Summary: Access Code: [email or web address]       Exercises Sidelying Hip Abduction with Resistance at Ankle- 10 reps- 2 sets- 1-3x daily- 7x weekly   X Band Walk- 10 reps- 2 sets- 1x daily- 7x weekly   Bird Dog on Swiss Ball- 10 reps- 2 sets- 1x daily- 7x weekly   Bridge with Heels on Swiss Ball- 10 reps- 2 sets- 1x daily- 7x weekly   Stool Scoots- 1 reps- 1x daily- 7x weekly         Time-based treatments/modalities:    Physical Therapy Timed Treatment Charges  Therapeutic exercise minutes (CPT 20265): 30 minutes      Pain rating (1-10) before treatment:  1  Pain rating (1-10) after treatment:  1    ASSESSMENT:   Response to treatment: Patient responded well to therapy with only an increase in fatigue with no pain.     PLAN/RECOMMENDATIONS:   Plan for treatment: therapy treatment to continue next visit.  Planned interventions for next visit: continue with current treatment.

## 2021-02-05 ENCOUNTER — PHYSICAL THERAPY (OUTPATIENT)
Dept: PHYSICAL THERAPY | Facility: REHABILITATION | Age: 31
End: 2021-02-05
Attending: FAMILY MEDICINE
Payer: COMMERCIAL

## 2021-02-05 DIAGNOSIS — M22.2X2 PATELLOFEMORAL DISORDER, LEFT: ICD-10-CM

## 2021-02-05 PROCEDURE — 97110 THERAPEUTIC EXERCISES: CPT

## 2021-02-06 NOTE — OP THERAPY DAILY TREATMENT
Outpatient Physical Therapy  DAILY TREATMENT     [facility] [place]  [address], [address]  [place] [address]  Phone:  [phone]  Fax:  [phone]    Date: 02/05/2021    Patient: Devon Pozo  YOB: 1990  MRN: [id number]     Time Calculation    Start time: 0900  Stop time: 0930 Time Calculation (min): 30 minutes         Chief Complaint: Knee Problem    Visit #: 4    SUBJECTIVE:  Patient reports no knee pain.     OBJECTIVE:  Current objective measures:   No pain with increased loading          Therapeutic Exercises (CPT 83615):     1. SL squats, xfatigue    2. Bike x3min L20    3. SL leans with squat, xfatigue    4. Bosu taps, 2x1min    5. High step step ups, xfatigue    6. Jumping from side to side , xfatigue    7. DD SL, xfatigue      Time-based treatments/modalities:    Physical Therapy Timed Treatment Charges  Therapeutic exercise minutes (CPT 33854): 30 minutes      Pain rating (1-10) before treatment:  0  Pain rating (1-10) after treatment:  0    ASSESSMENT:   Response to treatment: Patient reported only fatigue despite near max stressing of the quad. Patient to try hiking and determine if pain is still present.     PLAN/RECOMMENDATIONS:   Plan for treatment: therapy treatment to continue next visit.  Planned interventions for next visit: continue with current treatment.

## 2021-02-09 ENCOUNTER — PHYSICAL THERAPY (OUTPATIENT)
Dept: PHYSICAL THERAPY | Facility: REHABILITATION | Age: 31
End: 2021-02-09
Attending: FAMILY MEDICINE
Payer: COMMERCIAL

## 2021-02-09 DIAGNOSIS — M22.2X2 PATELLOFEMORAL DISORDER, LEFT: ICD-10-CM

## 2021-02-09 PROCEDURE — 97110 THERAPEUTIC EXERCISES: CPT

## 2021-02-09 NOTE — OP THERAPY DAILY TREATMENT
Outpatient Physical Therapy  DAILY TREATMENT     [facility] [place]  [address]  [place] [address]  Phone:  [phone]  Fax:  [phone]    Date: 02/09/2021    Patient: Devon Pozo  YOB: 1990  MRN: [id number]     Time Calculation    Start time: 0900  Stop time: 0930 Time Calculation (min): 30 minutes         Chief Complaint: Knee Problem    Visit #: 5    SUBJECTIVE:  Patient reports no knee pain with his beach hike.     OBJECTIVE:  Current objective measures:   No pain          Therapeutic Exercises (CPT 43347):     1. SL squats with DD, xfatigue    2. Bike x3min L20    3. SL leans with squat, xfatigue    4. Bosu stands, 2x1min    5. High step step ups, xfatigue    6. Jumping from side to side , xfatigue    7. DD SL, xfatigue    8. HS stretch/quad stretch/calf stretch, [id number]      Time-based treatments/modalities:    Physical Therapy Timed Treatment Charges  Therapeutic exercise minutes (CPT 13147): 30 minutes      Pain rating (1-10) before treatment:  0  Pain rating (1-10) after treatment:  0    ASSESSMENT:   Response to treatment: Patient responded well to therapy with only an increse in fatigue with no increase in pain. Decrease therapy frequency to 1x per week.     PLAN/RECOMMENDATIONS:   Plan for treatment: therapy treatment to continue next visit.  Planned interventions for next visit: continue with current treatment.

## 2021-02-12 ENCOUNTER — APPOINTMENT (OUTPATIENT)
Dept: PHYSICAL THERAPY | Facility: REHABILITATION | Age: 31
End: 2021-02-12
Attending: FAMILY MEDICINE
Payer: COMMERCIAL

## 2021-02-16 ENCOUNTER — APPOINTMENT (OUTPATIENT)
Dept: PHYSICAL THERAPY | Facility: REHABILITATION | Age: 31
End: 2021-02-16
Attending: FAMILY MEDICINE
Payer: COMMERCIAL

## 2021-02-19 ENCOUNTER — APPOINTMENT (OUTPATIENT)
Dept: PHYSICAL THERAPY | Facility: REHABILITATION | Age: 31
End: 2021-02-19
Attending: FAMILY MEDICINE
Payer: COMMERCIAL

## 2021-02-19 DIAGNOSIS — M22.2X2 PATELLOFEMORAL DISORDER, LEFT: ICD-10-CM

## 2021-02-19 PROCEDURE — 97110 THERAPEUTIC EXERCISES: CPT

## 2021-02-19 NOTE — OP THERAPY DAILY TREATMENT
Outpatient Physical Therapy  DAILY TREATMENT     [facility] Outpatient Physical Therapy [place]  [address], [address]  [facility] [address]  Phone:  [phone]  Fax:  [phone]    Date: 02/19/2021    Patient: Deovn Pozo  YOB: 1990  MRN: [id number]     Time Calculation    Start time: 0900  Stop time: 0930 Time Calculation (min): 30 minutes         Chief Complaint: Knee Problem    Visit #: 6    SUBJECTIVE:  Patient reports increased knee pain after an hour of snowboarding post last visit.     OBJECTIVE:  Current objective measures:   No pain with pistol squats  (-) patellar compression test          Therapeutic Exercises (CPT 60613):     1. Shuttle squats SL with DD, x4 min max    2. Bosu step training, x5min with mirror    3. Bosu standing, x2min    4. Bosu march, x5min    5. Pistol squats, xfatigue x4 times    6. SLR at wall , 4x fatigue 10sec holds      Time-based treatments/modalities:    Physical Therapy Timed Treatment Charges  Therapeutic exercise minutes (CPT 33586): 30 minutes      Pain rating (1-10) before treatment:  0  Pain rating (1-10) after treatment:  0    ASSESSMENT:   Response to treatment: Patient responded well to therapy with an increase in fatigue with no increase in pain.     PLAN/RECOMMENDATIONS:   Plan for treatment: therapy treatment to continue next visit.  Planned interventions for next visit: continue with current treatment.

## 2021-02-25 ENCOUNTER — PHYSICAL THERAPY (OUTPATIENT)
Dept: PHYSICAL THERAPY | Facility: REHABILITATION | Age: 31
End: 2021-02-25
Attending: FAMILY MEDICINE
Payer: COMMERCIAL

## 2021-02-25 DIAGNOSIS — M22.2X2 PATELLOFEMORAL DISORDER, LEFT: ICD-10-CM

## 2021-02-25 PROCEDURE — 97110 THERAPEUTIC EXERCISES: CPT

## 2021-02-25 NOTE — OP THERAPY DAILY TREATMENT
Outpatient Physical Therapy  DAILY TREATMENT     [facility] Out[facility] Physical Therapy [place]  [address], [address]  [facility] [facility] [place] [address]  Phone:  [phone]  Fax:  [phone]    Date: 02/25/2021    Patient: Devon Pozo  YOB: 1990  MRN: [id number]     Time Calculation    Start time: 1500  Stop time: 1530 Time Calculation (min): 30 minutes         Chief Complaint: Knee Problem    Visit #: 7    SUBJECTIVE:  Patient reports no knee pain even with a near 400lb squat.     OBJECTIVE:  Current objective measures:  All MMT 5/5  Pain unable to be reproduced with high intensity acitivty          Therapeutic Exercises (CPT 83281):     1. Bike iso 50rpm limit x4min    2. Glut steps to fatigue, x3    3. Long hops , 3x10    4. Bosu ant shear standing, x5min      Time-based treatments/modalities:    Physical Therapy Timed Treatment Charges  Therapeutic exercise minutes (CPT 46067): 30 minutes      Pain rating (1-10) before treatment:  0  Pain rating (1-10) after treatment:  0    ASSESSMENT:   Response to treatment: Patient to now continue with an HEP program only for 4 weeks, then return to therapy. Patient to trial a full return to activity.     PLAN/RECOMMENDATIONS:   Plan for treatment: therapy treatment to continue next visit.  Planned interventions for next visit: continue with current treatment.

## 2021-03-05 ENCOUNTER — APPOINTMENT (OUTPATIENT)
Dept: PHYSICAL THERAPY | Facility: REHABILITATION | Age: 31
End: 2021-03-05
Attending: FAMILY MEDICINE
Payer: COMMERCIAL

## 2021-03-12 ENCOUNTER — APPOINTMENT (OUTPATIENT)
Dept: PHYSICAL THERAPY | Facility: REHABILITATION | Age: 31
End: 2021-03-12
Attending: FAMILY MEDICINE
Payer: COMMERCIAL

## 2021-03-19 ENCOUNTER — APPOINTMENT (OUTPATIENT)
Dept: PHYSICAL THERAPY | Facility: REHABILITATION | Age: 31
End: 2021-03-19
Attending: FAMILY MEDICINE
Payer: COMMERCIAL

## 2021-03-26 ENCOUNTER — APPOINTMENT (OUTPATIENT)
Dept: PHYSICAL THERAPY | Facility: REHABILITATION | Age: 31
End: 2021-03-26
Attending: FAMILY MEDICINE
Payer: COMMERCIAL

## 2021-04-28 NOTE — TELEPHONE ENCOUNTER
Requested Prescriptions     Signed Prescriptions Disp Refills   • zolpidem (AMBIEN) 5 MG Tab 30 Tab 0     Sig: Take 1 Tab by mouth at bedtime as needed for Sleep.     Authorizing Provider: TAMMY RAM A.P.R.N.     Comment: Discussed very nice improvement. States has been well controlled since starting meds.  Discussed chronic and recurrent nature of the condition and discussed continuing current regimen versus tapering. Patient uses lexette infrequently.  After discussion prefers to taper over the next two weeks to oracea.  Will use lexette prn. Detail Level: Zone Render Risk Assessment In Note?: no

## 2021-04-29 ENCOUNTER — PHYSICAL THERAPY (OUTPATIENT)
Dept: PHYSICAL THERAPY | Facility: REHABILITATION | Age: 31
End: 2021-04-29
Attending: ORTHOPAEDIC SURGERY
Payer: COMMERCIAL

## 2021-04-29 DIAGNOSIS — M25.561 RIGHT KNEE PAIN, UNSPECIFIED CHRONICITY: ICD-10-CM

## 2021-04-29 PROCEDURE — 97161 PT EVAL LOW COMPLEX 20 MIN: CPT

## 2021-04-29 PROCEDURE — 97014 ELECTRIC STIMULATION THERAPY: CPT

## 2021-04-29 PROCEDURE — 97110 THERAPEUTIC EXERCISES: CPT

## 2021-04-29 SDOH — ECONOMIC STABILITY: GENERAL: QUALITY OF LIFE: EXCELLENT

## 2021-04-29 ASSESSMENT — ENCOUNTER SYMPTOMS
PAIN SCALE: 2
PAIN SCALE AT HIGHEST: 8
PAIN SCALE AT LOWEST: 1

## 2021-04-29 NOTE — OP THERAPY EVALUATION
Outpatient Physical Therapy  INITIAL EVALUATION    [facility] Physical Therapy [place]  [address], [address]  [facility] [address]  Phone:  [phone]  Fax:  [phone]    Date of Evaluation: 2021    Patient: Devon Pozo  YOB: 1990  MRN: [id number]     Referring Provider: Chapincito Mcguire M.D.  [address]  [address]  [place] [address]   Referring Diagnosis Sprain of anterior cruciate ligament of right knee, initial encounter [S83.511A]     Time Calculation    Start time: 1430  Stop time: 1530 Time Calculation (min): 60 minutes         Chief Complaint: Knee Problem and Post-Op Pain    Visit Diagnoses     ICD-10-CM   1. Right knee pain, unspecified chronicity  M25.561       No data found  Subjective:   History of Present Illness:     Date of surgery:  2021  Quality of life:  Excellent  Prior level of function:  New onset ACL tear due to skiing  Pain:     Current pain ratin    At best pain ratin    At worst pain ratin  Diagnostic Tests:     MRI studies: abnormal    Activities of Daily Living:     Patient reported ADL status: Limited with anything re: knee flexion  Limited weight bearing tolerance  Limited standing tolerance  Unable to participate in fitness activity  Patient Goals:     Patient goals for therapy:  Decreased pain, increased motion, increased strength, improved balance, decreased edema and independence with ADLs/IADLs    Patient is a 30 y.o. male that presents to therapy status post ACL repair patellar tendon autograft. States that the surgery was needed due to an injury while skiing. Reports the pain quality to be sharp/dull, intermittent and are primarily about the knee. Reports that symptoms now getting better.. Red flag.   Past Medical History:   Diagnosis Date   • ADHD    • Anxiety    • Depression    • History of palpitations    • Insomnia      Past Surgical History:   Procedure Laterality Date   • OPEN REDUCTION      lefT clavicle   •  THYROGLOSSAL DUCT EXCISION       Social History     Tobacco Use   • Smoking status: Former Smoker     Packs/day: 0.25     Types: Cigarettes     Quit date: 2013     Years since quittin.2   • Smokeless tobacco: Former User     Quit date: 2014   Substance Use Topics   • Alcohol use: Yes     Alcohol/week: 1.8 oz     Types: 3 Cans of beer per week     Comment: weekends     Family and Occupational History     Socioeconomic History   • Marital status:      Spouse name: Not on file   • Number of children: Not on file   • Years of education: Not on file   • Highest education level: Not on file   Occupational History   • Not on file       Objective     Tenderness   Left Knee   Tenderness in the ITB and pes anserinus.     Active Range of Motion     Right Knee   Flexion: 52 degrees   Extension: -10 degrees     Passive Range of Motion     Right Knee   Flexion: 55 degrees   Extension: -8 degrees     Patellar Mobility   Left Knee Hypomobile in the left medial, left lateral, left superior and left inferior patellar tendon(s).     Strength:      Left Knee   Flexion: 5  Extension: 5    Additional Strength Details  Minor ability to activate L quad    Tests     Additional Tests Details  No tests due to post op status    Swelling     Left Knee Girth Measurement (cm)   Joint line: 40.8 cm  10 cm above joint line: 43.7 cm  10 cm below joint line: 37.3 cm        Therapeutic Exercises (CPT 74662):     1. Quad sets, x20    2. Heel slides, x15 limit at 60deg or pain    3. Hamstring iso, x20    4. Glut sets, x20    5. Gait training with crutches, x5min    Therapeutic Treatments and Modalities:     1. E Stim Unattended (CPT 50379), IFC with CP x15min 80-150hz    Time-based treatments/modalities:    Physical Therapy Timed Treatment Charges  Therapeutic exercise minutes (CPT 75348): 10 minutes      Assessment, Response and Plan:   Impairments: abnormal or restricted ROM, activity intolerance, impaired physical strength and pain  with function    Assessment details:  Patient presents with signs and symptoms consistent with a post op condition. Patient limitations include weakness, decreased ROM, and pain. Patient will benefit from skilled therapy to improve the aforementioned deficits and decrease further functional decline. Plan to progress per post op ACL patellar autograft repair protocol.   Prognosis: good    Goals:   Short Term Goals:   1) Patient's quad will improve to facilitate a SLR.  2) Patient's flexion will improve to 90deg to facilitate progression through post op protocol.  Short term goal time span:  2-4 weeks      Long Term Goals:    1) Patient's symptoms will improve to allow for ambulation >1mile.  2) Patient's LEFS will improve by 20 to demonstrate functional improvement  Long term goal time span:  6-8 weeks    Plan:   Therapy options:  Physical therapy treatment to continue  Planned therapy interventions:  E Stim Unattended (CPT 66736), Manual Therapy (CPT 89408), Neuromuscular Re-education (CPT 48791), Therapeutic Exercise (CPT 64441) and Hot or Cold Pack Therapy (CPT 59107)  Frequency:  2x week  Duration in weeks:  12  Discussed with:  Patient      Functional Assessment Used  PT Functional Assessment Tool Used: LEFS  PT Functional Assessment Score: 22/80     Referring provider co-signature:  I have reviewed this plan of care and my co-signature certifies the need for services.    Certification Period: 04/29/2021 to  07/22/21    Physician Signature: ________________________________ Date: ______________

## 2021-05-04 ENCOUNTER — PHYSICAL THERAPY (OUTPATIENT)
Dept: PHYSICAL THERAPY | Facility: REHABILITATION | Age: 31
End: 2021-05-04
Attending: ORTHOPAEDIC SURGERY
Payer: COMMERCIAL

## 2021-05-04 DIAGNOSIS — M25.561 RIGHT KNEE PAIN, UNSPECIFIED CHRONICITY: ICD-10-CM

## 2021-05-04 PROCEDURE — 97014 ELECTRIC STIMULATION THERAPY: CPT

## 2021-05-04 PROCEDURE — 97110 THERAPEUTIC EXERCISES: CPT

## 2021-05-04 PROCEDURE — 97140 MANUAL THERAPY 1/> REGIONS: CPT

## 2021-05-05 NOTE — OP THERAPY DAILY TREATMENT
Outpatient Physical Therapy  DAILY TREATMENT     [facility] Outpatient Physical Therapy [place]  [address]  [facility] [address]  Phone:  [phone]  Fax:  [phone]    Date: 05/04/2021    Patient: Devon Pozo  YOB: 1990  MRN: [id number]     Time Calculation    Start time: 1530  Stop time: 1615 Time Calculation (min): 45 minutes         Chief Complaint: Knee Problem and Post-Op Pain    Visit #: 2    SUBJECTIVE:  Patient reports that symptoms have improved slightly. Notes less pain and improved quad function.     OBJECTIVE:  Current objective measures:     Knee ext -8deg      Therapeutic Exercises (CPT 73248):     1. Quad sets, x20    2. Heel slides, x15 limit at 60deg or pain    3. Hamstring iso, x20    4. Glut sets, x20    5. Gait training with crutches, x5min    Therapeutic Treatments and Modalities:     1. E Stim Unattended (CPT 53302), IFC with CP x15min 80-150hz    2. Manual Therapy (CPT 48207), STM to quad; light patellar mobs all directions; flexion PROM to tolerance    Time-based treatments/modalities:    Physical Therapy Timed Treatment Charges  Manual therapy minutes (CPT 11999): 10 minutes  Therapeutic exercise minutes (CPT 06789): 20 minutes      Pain rating (1-10) before treatment:  3  Pain rating (1-10) after treatment:  3    ASSESSMENT:   Response to treatment: Patient responded fair to therapy with improved quad function. Patient will continue to focus on ROM and quad activation.     PLAN/RECOMMENDATIONS:   Plan for treatment: therapy treatment to continue next visit.  Planned interventions for next visit: continue with current treatment.

## 2021-05-06 ENCOUNTER — PHYSICAL THERAPY (OUTPATIENT)
Dept: PHYSICAL THERAPY | Facility: REHABILITATION | Age: 31
End: 2021-05-06
Attending: ORTHOPAEDIC SURGERY
Payer: COMMERCIAL

## 2021-05-06 DIAGNOSIS — M25.561 RIGHT KNEE PAIN, UNSPECIFIED CHRONICITY: ICD-10-CM

## 2021-05-06 PROCEDURE — 97112 NEUROMUSCULAR REEDUCATION: CPT

## 2021-05-06 PROCEDURE — 97110 THERAPEUTIC EXERCISES: CPT

## 2021-05-06 PROCEDURE — 97014 ELECTRIC STIMULATION THERAPY: CPT

## 2021-05-07 NOTE — OP THERAPY DAILY TREATMENT
Outpatient Physical Therapy  DAILY TREATMENT     [facility] Outpatient Physical Therapy [place]  [address]  [place] [facility] [place] [address]  Phone:  [phone]  Fax:  [phone]    Date: 05/06/2021    Patient: Devon Pozo  YOB: 1990  MRN: [id number]     Time Calculation    Start time: 1430  Stop time: 1515 Time Calculation (min): 45 minutes         Chief Complaint: Knee Problem and Post-Op Pain    Visit #: 3    SUBJECTIVE:  Patient reports that he is feeling stronger. Notes only a pull/pain at the patella.     OBJECTIVE:  Current objective measures:   Knee flexion approaching 90deg          Therapeutic Exercises (CPT 00047):     1. Quad sets, x20    2. Heel slides, x15 limit at 60deg or pain    3. Hamstring iso, x20    4. Glut sets, x20    6. Wall slides, x3min    7. Bike, x5min    Therapeutic Treatments and Modalities:     1. E Stim Unattended (CPT 20146), IFC with CP x15min 80-150hz    2. Neuromuscular Re-education (CPT 02252), NMES to quad with QS russian     Time-based treatments/modalities:    Physical Therapy Timed Treatment Charges  Neuromusc re-ed, balance, coor, post minutes (CPT 03802): 10 minutes  Therapeutic exercise minutes (CPT 36755): 20 minutes      Pain rating (1-10) before treatment:  1  Pain rating (1-10) after treatment:  1    ASSESSMENT:   Response to treatment: Patient responded well with an increase in ROM and improved quad contraction.     PLAN/RECOMMENDATIONS:   Plan for treatment: therapy treatment to continue next visit.  Planned interventions for next visit: continue with current treatment.

## 2021-05-12 ENCOUNTER — PHYSICAL THERAPY (OUTPATIENT)
Dept: PHYSICAL THERAPY | Facility: REHABILITATION | Age: 31
End: 2021-05-12
Attending: ORTHOPAEDIC SURGERY
Payer: COMMERCIAL

## 2021-05-12 DIAGNOSIS — M25.561 RIGHT KNEE PAIN, UNSPECIFIED CHRONICITY: ICD-10-CM

## 2021-05-12 DIAGNOSIS — M22.2X2 PATELLOFEMORAL DISORDER, LEFT: ICD-10-CM

## 2021-05-12 PROCEDURE — 97112 NEUROMUSCULAR REEDUCATION: CPT

## 2021-05-12 PROCEDURE — 97110 THERAPEUTIC EXERCISES: CPT

## 2021-05-12 PROCEDURE — 97014 ELECTRIC STIMULATION THERAPY: CPT

## 2021-05-12 NOTE — OP THERAPY DAILY TREATMENT
Outpatient Physical Therapy  DAILY TREATMENT     [facility] Outpatient Physical Therapy [place]  [address]  [facility] [address]  Phone:  [phone]  Fax:  [phone]    Date: 05/12/2021    Patient: Devon Pozo  YOB: 1990  MRN: [id number]     Time Calculation    Start time: 0830  Stop time: 0915 Time Calculation (min): 45 minutes         Chief Complaint: Knee Problem    Visit #: 4    SUBJECTIVE:  Patient reports that his quad is feeling better. Notes increased strength and no crutches.     OBJECTIVE:  Current objective measures:   AROM: 1-96deg  Able to SLR          Therapeutic Exercises (CPT 48224):     1. Quad sets, x20    2. Heel slides, x15 limit at 60deg or pain    3. Hamstring iso, x20    4. Glut sets, x20    6. Wall slides, x3min    7. Bike, x5min    Therapeutic Treatments and Modalities:     1. E Stim Unattended (CPT 65530), IFC with CP x15min 80-150hz    2. Neuromuscular Re-education (CPT 10283), NMES to quad with QS russian     Time-based treatments/modalities:    Physical Therapy Timed Treatment Charges  Neuromusc re-ed, balance, coor, post minutes (CPT 46632): 10 minutes  Therapeutic exercise minutes (CPT 01552): 20 minutes      Pain rating (1-10) before treatment:  2  Pain rating (1-10) after treatment:  2    ASSESSMENT:   Response to treatment: Patient is demonstrating an improvement in strength. Now able to peform SLR. Plan to continue progressing patient as able.     PLAN/RECOMMENDATIONS:   Plan for treatment: therapy treatment to continue next visit.  Planned interventions for next visit: continue with current treatment.

## 2021-05-14 ENCOUNTER — PHYSICAL THERAPY (OUTPATIENT)
Dept: PHYSICAL THERAPY | Facility: REHABILITATION | Age: 31
End: 2021-05-14
Attending: ORTHOPAEDIC SURGERY
Payer: COMMERCIAL

## 2021-05-14 DIAGNOSIS — M25.561 RIGHT KNEE PAIN, UNSPECIFIED CHRONICITY: ICD-10-CM

## 2021-05-14 PROCEDURE — 97112 NEUROMUSCULAR REEDUCATION: CPT

## 2021-05-14 PROCEDURE — 97110 THERAPEUTIC EXERCISES: CPT

## 2021-05-14 PROCEDURE — 97014 ELECTRIC STIMULATION THERAPY: CPT

## 2021-05-14 NOTE — OP THERAPY DAILY TREATMENT
Outpatient Physical Therapy  DAILY TREATMENT     [facility] Outpatient Physical Therapy [place]  [address]  [place] [place] [address]  Phone:  [phone]  Fax:  [phone]    Date: 05/14/2021    Patient: Devon Pozo  YOB: 1990  MRN: [id number]     Time Calculation    Start time: 0730  Stop time: 0815 Time Calculation (min): 45 minutes         Chief Complaint: Post-Op Pain and Knee Problem    Visit #: 5    SUBJECTIVE:  Patient reports that his knee feels tight. Notes low pain and improved function.     OBJECTIVE:  Current objective measures: 1-98deg AAROM          Therapeutic Exercises (CPT 47879):     1. Quad sets, x20    2. Heel slides, x15 limit at 60deg or pain    3. Hamstring iso, x20    4. Glut sets, x20    5. Mini knee bends, x20    6. Gait training, x3min    7. Bike, x5min    Therapeutic Treatments and Modalities:     1. E Stim Unattended (CPT 67874), IFC with CP x15min 80-150hz    2. Neuromuscular Re-education (CPT 02485), NMES to quad with QS russian     3. Manual Therapy (CPT 34524), patellar mobilizations    Time-based treatments/modalities:    Physical Therapy Timed Treatment Charges  Manual therapy minutes (CPT 32689): 5 minutes  Neuromusc re-ed, balance, coor, post minutes (CPT 43860): 10 minutes  Therapeutic exercise minutes (CPT 49926): 15 minutes      Pain rating (1-10) before treatment:  2  Pain rating (1-10) after treatment:  0    ASSESSMENT:   Response to treatment: Patient responded well to therapy with an overall improvement in function and continued ability to SLR.     PLAN/RECOMMENDATIONS:   Plan for treatment: therapy treatment to continue next visit.  Planned interventions for next visit: continue with current treatment.

## 2021-05-19 ENCOUNTER — PHYSICAL THERAPY (OUTPATIENT)
Dept: PHYSICAL THERAPY | Facility: REHABILITATION | Age: 31
End: 2021-05-19
Attending: ORTHOPAEDIC SURGERY
Payer: COMMERCIAL

## 2021-05-19 DIAGNOSIS — M25.561 RIGHT KNEE PAIN, UNSPECIFIED CHRONICITY: ICD-10-CM

## 2021-05-19 PROCEDURE — 97110 THERAPEUTIC EXERCISES: CPT

## 2021-05-19 PROCEDURE — 97014 ELECTRIC STIMULATION THERAPY: CPT

## 2021-05-19 PROCEDURE — 97112 NEUROMUSCULAR REEDUCATION: CPT

## 2021-05-19 NOTE — OP THERAPY DAILY TREATMENT
Outpatient Physical Therapy  DAILY TREATMENT     [facility] Outpatient Physical Therapy [place]  [address]  [place] [facility] [address]  Phone:  [phone]  Fax:  [phone]    Date: 05/19/2021    Patient: Devon Pozo  YOB: 1990  MRN: [id number]     Time Calculation    Start time: 0800  Stop time: 0830 Time Calculation (min): 30 minutes         Chief Complaint: Post-Op Pain and Knee Problem    Visit #: 6    SUBJECTIVE:  Patient reports that symptoms are improving. States that his knee is feeling stronger.     OBJECTIVE:  Current objective measures:   PROM: 0-115  AROM: 2-110          Therapeutic Exercises (CPT 00338):     1. Quad sets, x20    2. Heel slides, x15 limit at 60deg or pain    3. Hamstring iso, x20    4. Glut sets, x20    5. Mini knee bends, x20    6. Gait training, x3min    7. Bike, x5min    8. Prone knee hang, x2min    Therapeutic Treatments and Modalities:     1. E Stim Unattended (CPT 30261), IFC with CP x15min 80-150hz    2. Neuromuscular Re-education (CPT 38832), NMES to quad with QS russian     Time-based treatments/modalities:    Physical Therapy Timed Treatment Charges  Neuromusc re-ed, balance, coor, post minutes (CPT 74250): 10 minutes  Therapeutic exercise minutes (CPT 57195): 20 minutes      Pain rating (1-10) before treatment:  1  Pain rating (1-10) after treatment:  1    ASSESSMENT:   Response to treatment: Patient ROM and strength are improving. No quad lag with SLR. Patient to continue with HEP and progress per protocol.     PLAN/RECOMMENDATIONS:   Plan for treatment: therapy treatment to continue next visit.  Planned interventions for next visit: continue with current treatment.

## 2021-05-21 ENCOUNTER — PHYSICAL THERAPY (OUTPATIENT)
Dept: PHYSICAL THERAPY | Facility: REHABILITATION | Age: 31
End: 2021-05-21
Attending: ORTHOPAEDIC SURGERY
Payer: COMMERCIAL

## 2021-05-21 DIAGNOSIS — M25.561 RIGHT KNEE PAIN, UNSPECIFIED CHRONICITY: ICD-10-CM

## 2021-05-21 PROCEDURE — 97014 ELECTRIC STIMULATION THERAPY: CPT

## 2021-05-21 PROCEDURE — 97110 THERAPEUTIC EXERCISES: CPT

## 2021-05-21 PROCEDURE — 97112 NEUROMUSCULAR REEDUCATION: CPT

## 2021-05-21 NOTE — OP THERAPY DAILY TREATMENT
Outpatient Physical Therapy  DAILY TREATMENT     [facility] Out[facility] Physical Therapy [place]  [address]  [facility] [address]  Phone:  [phone]  Fax:  [phone]    Date: 05/21/2021    Patient: Devon Pozo  YOB: 1990  MRN: [id number]     Time Calculation    Start time: 0730  Stop time: 0830 Time Calculation (min): 60 minutes         Chief Complaint: Knee Problem and Post-Op Pain    Visit #: 7    SUBJECTIVE:  Patient reports that he is doing well. Notes increased strength.     OBJECTIVE:  Current objective measures:   AROM: 1-117deg  PROM: 0-120deg          Therapeutic Exercises (CPT 78378):     1. Quad sets, x20    2. Heel slides, x15 limit at 60deg or pain    3. VMO SLR, x10    4. Glut sets, x20    5. Bridge, x20    6. Gait training, x3min    7. Bike, x5min    8. Prone knee hang, x2min    9. Shuttle, x5c x4min    Therapeutic Treatments and Modalities:     1. E Stim Unattended (CPT 92838), IFC with CP x15min 80-150hz    2. Neuromuscular Re-education (CPT 05343), NMES to quad with QS russian     Time-based treatments/modalities:    Physical Therapy Timed Treatment Charges  Neuromusc re-ed, balance, coor, post minutes (CPT 40346): 10 minutes  Therapeutic exercise minutes (CPT 78268): 30 minutes      Pain rating (1-10) before treatment:  1  Pain rating (1-10) after treatment:  0    ASSESSMENT:   Response to treatment: Patient is demonstrating improved quad control, ROM, and overall function. Continue with HEP as able.    PLAN/RECOMMENDATIONS:   Plan for treatment: therapy treatment to continue next visit.  Planned interventions for next visit: continue with current treatment.

## 2021-05-26 ENCOUNTER — PHYSICAL THERAPY (OUTPATIENT)
Dept: PHYSICAL THERAPY | Facility: REHABILITATION | Age: 31
End: 2021-05-26
Attending: ORTHOPAEDIC SURGERY
Payer: COMMERCIAL

## 2021-05-26 DIAGNOSIS — M25.561 RIGHT KNEE PAIN, UNSPECIFIED CHRONICITY: ICD-10-CM

## 2021-05-26 PROCEDURE — 97014 ELECTRIC STIMULATION THERAPY: CPT

## 2021-05-26 PROCEDURE — 97112 NEUROMUSCULAR REEDUCATION: CPT

## 2021-05-26 PROCEDURE — 97110 THERAPEUTIC EXERCISES: CPT

## 2021-05-26 NOTE — OP THERAPY DAILY TREATMENT
Outpatient Physical Therapy  DAILY TREATMENT     [facility] Outpatient Physical Therapy [place]  [address]  [place] [facility] [place] [facility] [address]  Phone:  [phone]  Fax:  [phone]    Date: 05/26/2021    Patient: Devon Pozo  YOB: 1990  MRN: [id number]     Time Calculation    Start time: 0830  Stop time: 0930 Time Calculation (min): 60 minutes         Chief Complaint: Post-Op Pain and Knee Problem    Visit #: 8    SUBJECTIVE:  Patient reports that he is doing well. States that overall improvement is occurring.     OBJECTIVE:  Current objective measures:   AROM: 1-123deg  PROM: 0-128deg  .5deg quad leg          Therapeutic Exercises (CPT 34400):     1. Quad sets, x20    2. Heel slides, x15 limit at 60deg or pain    3. VMO SLR, x10    4. Glut sets, x20    5. Bridge, x20    6. Gait training, x3min    7. Bike, x5min    8. Prone knee hang, x2min    9. Shuttle, x5c x4min    10. 4 way hip, xfatigue    11. Stair training , 2in step    12. Squat training focus on form with slight FU, x10    Therapeutic Treatments and Modalities:     1. E Stim Unattended (CPT 49235), IFC with CP x15min 80-150hz    2. Neuromuscular Re-education (CPT 43873), NMES to quad with QS russian     Time-based treatments/modalities:    Physical Therapy Timed Treatment Charges  Manual therapy minutes (CPT 00454): 5 minutes  Neuromusc re-ed, balance, coor, post minutes (CPT 66464): 10 minutes  Therapeutic exercise minutes (CPT 04983): 25 minutes        ASSESSMENT:   Response to treatment: Patient continues to progress well into his next phase of care. Patient still is not at full 0deg ROM.     PLAN/RECOMMENDATIONS:   Plan for treatment: therapy treatment to continue next visit.  Planned interventions for next visit: continue with current treatment.

## 2021-05-28 ENCOUNTER — PHYSICAL THERAPY (OUTPATIENT)
Dept: PHYSICAL THERAPY | Facility: REHABILITATION | Age: 31
End: 2021-05-28
Attending: ORTHOPAEDIC SURGERY
Payer: COMMERCIAL

## 2021-05-28 DIAGNOSIS — M25.561 RIGHT KNEE PAIN, UNSPECIFIED CHRONICITY: ICD-10-CM

## 2021-05-28 PROCEDURE — 97014 ELECTRIC STIMULATION THERAPY: CPT

## 2021-05-28 PROCEDURE — 97112 NEUROMUSCULAR REEDUCATION: CPT

## 2021-05-28 PROCEDURE — 97110 THERAPEUTIC EXERCISES: CPT

## 2021-05-29 NOTE — OP THERAPY DAILY TREATMENT
Outpatient Physical Therapy  DAILY TREATMENT     [facility] Outpatient Physical Therapy [place]  [address]  [place] [address]  Phone:  [phone]  Fax:  [phone]    Date: 05/28/2021    Patient: Devon Pozo  YOB: 1990  MRN: [id number]     Time Calculation    Start time: 1330  Stop time: 1415 Time Calculation (min): 45 minutes         Chief Complaint: Knee Problem and Post-Op Pain    Visit #: 9    SUBJECTIVE:  Patient reports that his knee is feeling better. Notes overall decreased pain and increased ROM.     OBJECTIVE:  Current objective measures:           Therapeutic Exercises (CPT 18101):     1. Quad sets, x20    2. Heel slides, x15 limit at 60deg or pain    3. VMO SLR, x10    4. Glut sets, x20    5. Bridge, x20    6. Gait training, x3min    7. Bike, x5min    8. Prone knee hang, x2min    9. Shuttle, x5c x4min    10. 4 way hip, xfatigue    11. Stair training , 2in step    12. Squat training focus on form with slight FU, x10    Therapeutic Treatments and Modalities:     1. E Stim Unattended (CPT 27280), IFC with CP x15min 80-150hz    2. Neuromuscular Re-education (CPT 05692), NMES to quad with QS russian     Time-based treatments/modalities:    Physical Therapy Timed Treatment Charges  Neuromusc re-ed, balance, coor, post minutes (CPT 29195): 10 minutes  Therapeutic exercise minutes (CPT 61495): 20 minutes      Pain rating (1-10) before treatment:  1  Pain rating (1-10) after treatment:  0    ASSESSMENT:   Response to treatment: Patient will now discharge from therapy due to insurance change and continue at another clinic. Plan to discharge with good ROM and progressing strength.     PLAN/RECOMMENDATIONS:   Plan for treatment: Discharge.  Planned interventions for next visit: Discharge .

## 2021-06-02 ENCOUNTER — APPOINTMENT (OUTPATIENT)
Dept: PHYSICAL THERAPY | Facility: REHABILITATION | Age: 31
End: 2021-06-02
Attending: ORTHOPAEDIC SURGERY
Payer: COMMERCIAL

## 2021-06-04 ENCOUNTER — APPOINTMENT (OUTPATIENT)
Dept: PHYSICAL THERAPY | Facility: REHABILITATION | Age: 31
End: 2021-06-04
Attending: ORTHOPAEDIC SURGERY
Payer: COMMERCIAL

## 2021-06-09 ENCOUNTER — APPOINTMENT (OUTPATIENT)
Dept: PHYSICAL THERAPY | Facility: REHABILITATION | Age: 31
End: 2021-06-09
Attending: ORTHOPAEDIC SURGERY
Payer: COMMERCIAL

## 2021-06-11 ENCOUNTER — APPOINTMENT (OUTPATIENT)
Dept: PHYSICAL THERAPY | Facility: REHABILITATION | Age: 31
End: 2021-06-11
Attending: ORTHOPAEDIC SURGERY
Payer: COMMERCIAL

## 2021-06-16 ENCOUNTER — APPOINTMENT (OUTPATIENT)
Dept: PHYSICAL THERAPY | Facility: REHABILITATION | Age: 31
End: 2021-06-16
Attending: ORTHOPAEDIC SURGERY
Payer: COMMERCIAL

## 2021-06-18 ENCOUNTER — APPOINTMENT (OUTPATIENT)
Dept: PHYSICAL THERAPY | Facility: REHABILITATION | Age: 31
End: 2021-06-18
Attending: ORTHOPAEDIC SURGERY
Payer: COMMERCIAL

## 2021-06-23 ENCOUNTER — APPOINTMENT (OUTPATIENT)
Dept: PHYSICAL THERAPY | Facility: REHABILITATION | Age: 31
End: 2021-06-23
Attending: ORTHOPAEDIC SURGERY
Payer: COMMERCIAL

## 2021-06-25 ENCOUNTER — APPOINTMENT (OUTPATIENT)
Dept: PHYSICAL THERAPY | Facility: REHABILITATION | Age: 31
End: 2021-06-25
Attending: ORTHOPAEDIC SURGERY
Payer: COMMERCIAL

## 2021-06-30 ENCOUNTER — APPOINTMENT (OUTPATIENT)
Dept: PHYSICAL THERAPY | Facility: REHABILITATION | Age: 31
End: 2021-06-30
Attending: ORTHOPAEDIC SURGERY
Payer: COMMERCIAL

## 2021-10-05 ENCOUNTER — TELEPHONE (OUTPATIENT)
Dept: PHYSICAL THERAPY | Facility: REHABILITATION | Age: 31
End: 2021-10-05

## 2021-10-05 NOTE — OP THERAPY DISCHARGE SUMMARY
Outpatient Physical Therapy  DISCHARGE SUMMARY NOTE      [facility] [place]  [address]  [facility] [address]  Phone:  [phone]  Fax:  [phone]    Date of Visit: 10/05/2021    Patient: Devon Pozo  YOB: 1990  MRN: [id number]     Referring Provider: Tej Richardson M.D.   Referring Diagnosis Patellofemoral disorder, left [M22.2X2]           Your patient is being discharged from Physical Therapy with the following comments:   · Change of status    Comments:  Patient tore ACL on non involved knee    Limitations Remaining:  NA    Recommendations:  Discharge to [facility]    Roberto Marti, PT, DPT    Date: 10/5/2021

## 2021-10-05 NOTE — OP THERAPY DISCHARGE SUMMARY
Outpatient Physical Therapy  DISCHARGE SUMMARY NOTE      [facility] [place]  [address], [address]  [place] [address]  Phone:  [phone]  Fax:  [phone]    Date of Visit: 10/05/2021    Patient: Devon Pozo  YOB: 1990  MRN: [id number]     Referring Provider: Chapincito Mcguire M.D.   Referring Diagnosis Sprain of anterior cruciate ligament of right knee, initial encounter [S83.248U]           Your patient is being discharged from Physical Therapy with the following comments:   · INS contract change    Comments:  Tomas Pozo has been discharged due to a lapse in care greater than 30 days. Thank you for the opportunity to assist you and your patient.     Limitations Remaining:  See last progress note    Recommendations:  Discharge    Roberto Marti, PT, DPT    Date: 10/5/2021

## 2021-10-16 ENCOUNTER — OFFICE VISIT (OUTPATIENT)
Dept: URGENT CARE | Facility: PHYSICIAN GROUP | Age: 31
End: 2021-10-16
Payer: COMMERCIAL

## 2021-10-16 ENCOUNTER — HOSPITAL ENCOUNTER (OUTPATIENT)
Facility: MEDICAL CENTER | Age: 31
End: 2021-10-16
Attending: PHYSICIAN ASSISTANT
Payer: COMMERCIAL

## 2021-10-16 ENCOUNTER — HOSPITAL ENCOUNTER (OUTPATIENT)
Dept: LAB | Facility: MEDICAL CENTER | Age: 31
End: 2021-10-16
Attending: PHYSICIAN ASSISTANT
Payer: COMMERCIAL

## 2021-10-16 VITALS
RESPIRATION RATE: 16 BRPM | SYSTOLIC BLOOD PRESSURE: 122 MMHG | DIASTOLIC BLOOD PRESSURE: 72 MMHG | HEIGHT: 73 IN | TEMPERATURE: 97.9 F | HEART RATE: 80 BPM | WEIGHT: 195 LBS | BODY MASS INDEX: 25.84 KG/M2 | OXYGEN SATURATION: 95 %

## 2021-10-16 DIAGNOSIS — N48.9 PENILE LESION: ICD-10-CM

## 2021-10-16 DIAGNOSIS — A63.0 CONDYLOMA OF MALE GENITALIA: ICD-10-CM

## 2021-10-16 LAB
HAV IGM SERPL QL IA: NORMAL
HBV CORE IGM SER QL: NORMAL
HBV SURFACE AG SER QL: NORMAL
HCV AB SER QL: NORMAL
HIV 1+2 AB+HIV1 P24 AG SERPL QL IA: NORMAL
TREPONEMA PALLIDUM IGG+IGM AB [PRESENCE] IN SERUM OR PLASMA BY IMMUNOASSAY: NORMAL

## 2021-10-16 PROCEDURE — 80074 ACUTE HEPATITIS PANEL: CPT

## 2021-10-16 PROCEDURE — 99214 OFFICE O/P EST MOD 30 MIN: CPT | Performed by: PHYSICIAN ASSISTANT

## 2021-10-16 PROCEDURE — 87491 CHLMYD TRACH DNA AMP PROBE: CPT

## 2021-10-16 PROCEDURE — 87389 HIV-1 AG W/HIV-1&-2 AB AG IA: CPT

## 2021-10-16 PROCEDURE — 36415 COLL VENOUS BLD VENIPUNCTURE: CPT

## 2021-10-16 PROCEDURE — 86780 TREPONEMA PALLIDUM: CPT

## 2021-10-16 PROCEDURE — 87591 N.GONORRHOEAE DNA AMP PROB: CPT

## 2021-10-16 RX ORDER — DEXTROAMPHETAMINE SACCHARATE, AMPHETAMINE ASPARTATE, DEXTROAMPHETAMINE SULFATE AND AMPHETAMINE SULFATE 7.5; 7.5; 7.5; 7.5 MG/1; MG/1; MG/1; MG/1
30 TABLET ORAL 2 TIMES DAILY
COMMUNITY
End: 2022-10-09

## 2021-10-16 ASSESSMENT — ENCOUNTER SYMPTOMS
CHILLS: 0
FEVER: 0
GASTROINTESTINAL NEGATIVE: 1

## 2021-10-16 NOTE — PROGRESS NOTES
Subjective     Devon Pozo is a 31 y.o. male who presents with Sexually Transmitted Diseases (rash, redness, swelling, x2-3 months, possible exposure.)    Medications:    • amphetamine-dextroamphetamine  • MULTIVITAMIN PO    Allergies: Patient has no known allergies.    Problem List: Tomas Pozo does not have any pertinent problems on file.    Surgical History:  Past Surgical History:   Procedure Laterality Date   • OPEN REDUCTION      lefT clavicle   • THYROGLOSSAL DUCT EXCISION         Past Social Hx: Tomas Pozo  reports that he quit smoking about 8 years ago. His smoking use included cigarettes. He smoked 0.25 packs per day. He quit smokeless tobacco use about 7 years ago. He reports current alcohol use of about 1.8 oz of alcohol per week. He reports current drug use. Drug: Marijuana.     Past Family Hx:  Tomas Pozo family history includes Hypertension in his father; No Known Problems in his mother; Psychiatric Illness in his sister.     Problem list, medications, and allergies reviewed by myself today in Epic.         Patient presents with:  Sexually Transmitted Diseases: rash, redness, swelling, x2-3 months, possible exposure to HPV    Patient presents clinic today with concern of a few flat penile lesions at the base of his penis that have been there for about 2 to 3 months. Patient denies dysuria, penile discharge, blisters, ulcerations, fever, chills, inguinal lymphadenopathy, or cold sores. Patient believes this may be HPV. He is 31 years old so did not receive HPV vaccines. Patient has been attempting to use over-the-counter topical wart removal medication which has been somewhat effective but quite painful. Patient would like to have them evaluated and possibly removed today.    Patient is also requesting STD testing, he was in a monogamous relationship for approximately 9 months, but has not been in that relationship for a while. Patient would like to have STD testing before entering into any new  "relationships. Patient states he has a primary care provider, Lorena Vásquez however he has not seen her in a year or 2.    Sexually Transmitted Diseases  This is a new problem. The current episode started more than 1 month ago. The problem has been gradually worsening. Associated symptoms include a rash. Pertinent negatives include no chills, fever or urinary symptoms. Nothing aggravates the symptoms. Treatments tried: topical wart removal. The treatment provided mild relief.       Review of Systems   Constitutional: Negative for chills, fever and malaise/fatigue.   Gastrointestinal: Negative.    Skin: Positive for rash. Negative for itching.   All other systems reviewed and are negative.             Objective     /72   Pulse 80   Temp 36.6 °C (97.9 °F) (Temporal)   Resp 16   Ht 1.854 m (6' 1\")   Wt 88.5 kg (195 lb)   SpO2 95%   BMI 25.73 kg/m²      Physical Exam  Vitals and nursing note reviewed. Exam conducted with a chaperone present.   Constitutional:       General: He is not in acute distress.     Appearance: Normal appearance. He is well-developed and normal weight. He is not ill-appearing or toxic-appearing.   HENT:      Head: Normocephalic and atraumatic.      Right Ear: Tympanic membrane normal.      Left Ear: Tympanic membrane normal.      Nose: Nose normal.      Mouth/Throat:      Lips: Pink.      Mouth: Mucous membranes are moist.      Pharynx: Oropharynx is clear. Uvula midline.   Eyes:      Extraocular Movements: Extraocular movements intact.      Conjunctiva/sclera: Conjunctivae normal.      Pupils: Pupils are equal, round, and reactive to light.   Cardiovascular:      Rate and Rhythm: Normal rate and regular rhythm.      Pulses: Normal pulses.      Heart sounds: Normal heart sounds.   Pulmonary:      Effort: Pulmonary effort is normal.      Breath sounds: Normal breath sounds.   Abdominal:      General: Bowel sounds are normal.      Palpations: Abdomen is soft.   Genitourinary:     " Penis: Circumcised. Lesions (several flat round/oval lesions to base of shaft as well as on ventral surface of shaft of penis. appears c/w condyloma) present. No erythema, tenderness, discharge or swelling.        Musculoskeletal:         General: Normal range of motion.      Cervical back: Normal range of motion and neck supple.   Lymphadenopathy:      Lower Body: No right inguinal adenopathy. No left inguinal adenopathy.   Skin:     General: Skin is warm and dry.      Capillary Refill: Capillary refill takes less than 2 seconds.   Neurological:      General: No focal deficit present.      Mental Status: He is alert and oriented to person, place, and time.      Cranial Nerves: No cranial nerve deficit.      Motor: Motor function is intact.      Coordination: Coordination is intact.      Gait: Gait normal.   Psychiatric:         Mood and Affect: Mood normal.                     Assessment & Plan               1. Penile lesion  HIV AG/AB COMBO ASSAY SCREENING    HEPATITIS PANEL ACUTE(4 COMPONENTS)    T.PALLIDUM AB EIA    HSV 1/2 By PCR(Herpes)+[id number]    Chlamydia/GC PCR Urine Or Swab    REFERRAL TO UROLOGY    REFERRAL TO DERMATOLOGY   2. 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      Discussed with patient that we do not do the sort of treatment in urgent care, however I did give patient a referral to urology as well as dermatology and ordered some STD testing blood work for patient to complete in the lab. Patient verbalized understanding and agrees with plan of following up with urology or dermatology for further evaluation of the skin lesions.    Discussed STD diagnosis, risks and transmission.  If pt is not using protection, they are engaging in high risk sexual behavior which exposes them to HIV, syphilis, hepatitis, herpes, gonorrhea, chlamydia and trichomoniasis.  PT was encouraged to always use protection to reduce  transmission of these STDs.     Pt instructed to refrain from any sexual activities or sexual contact with others until treatment is completed.      I have spent at least 30 minutes on the care of this patient.  This includes preparing for visit which includes review of previous visits if available in EMR, obtaining HPI, exam and evaluation of patient, ordering and independent interpretation of labs, imaging, tests, medical management, counseling, education and documentation.

## 2022-10-09 ENCOUNTER — OFFICE VISIT (OUTPATIENT)
Dept: URGENT CARE | Facility: CLINIC | Age: 32
End: 2022-10-09
Payer: COMMERCIAL

## 2022-10-09 VITALS
DIASTOLIC BLOOD PRESSURE: 76 MMHG | HEART RATE: 89 BPM | RESPIRATION RATE: 14 BRPM | HEIGHT: 73 IN | BODY MASS INDEX: 28.76 KG/M2 | OXYGEN SATURATION: 97 % | SYSTOLIC BLOOD PRESSURE: 118 MMHG | TEMPERATURE: 97.7 F | WEIGHT: 217 LBS

## 2022-10-09 DIAGNOSIS — R05.1 ACUTE COUGH: ICD-10-CM

## 2022-10-09 DIAGNOSIS — J32.9 RHINOSINUSITIS: ICD-10-CM

## 2022-10-09 DIAGNOSIS — J02.9 SORE THROAT: ICD-10-CM

## 2022-10-09 LAB
INT CON NEG: NORMAL
INT CON POS: NORMAL
S PYO AG THROAT QL: NEGATIVE

## 2022-10-09 PROCEDURE — 99213 OFFICE O/P EST LOW 20 MIN: CPT | Performed by: FAMILY MEDICINE

## 2022-10-09 PROCEDURE — 87880 STREP A ASSAY W/OPTIC: CPT | Performed by: FAMILY MEDICINE

## 2022-10-09 RX ORDER — DEXAMETHASONE 6 MG/1
6 TABLET ORAL DAILY
Qty: 3 TABLET | Refills: 0 | Status: SHIPPED | OUTPATIENT
Start: 2022-10-09 | End: 2023-06-07

## 2022-10-09 RX ORDER — ALBUTEROL SULFATE 90 UG/1
2 AEROSOL, METERED RESPIRATORY (INHALATION) EVERY 6 HOURS PRN
Qty: 8.5 G | Refills: 3 | Status: SHIPPED | OUTPATIENT
Start: 2022-10-09 | End: 2023-06-07

## 2022-10-09 RX ORDER — DOXYCYCLINE HYCLATE 100 MG
100 TABLET ORAL EVERY 12 HOURS
Qty: 14 TABLET | Refills: 0 | Status: SHIPPED | OUTPATIENT
Start: 2022-10-09 | End: 2022-10-09 | Stop reason: SDUPTHER

## 2022-10-09 RX ORDER — ALBUTEROL SULFATE 90 UG/1
2 AEROSOL, METERED RESPIRATORY (INHALATION) EVERY 6 HOURS PRN
Qty: 8.5 G | Refills: 3 | Status: SHIPPED | OUTPATIENT
Start: 2022-10-09 | End: 2022-10-09 | Stop reason: SDUPTHER

## 2022-10-09 RX ORDER — DOXYCYCLINE HYCLATE 100 MG
100 TABLET ORAL EVERY 12 HOURS
Qty: 14 TABLET | Refills: 0 | Status: SHIPPED | OUTPATIENT
Start: 2022-10-09 | End: 2022-10-16

## 2022-10-09 RX ORDER — DEXTROMETHORPHAN HYDROBROMIDE AND PROMETHAZINE HYDROCHLORIDE 15; 6.25 MG/5ML; MG/5ML
5 SYRUP ORAL EVERY 6 HOURS PRN
Qty: 120 ML | Refills: 0 | Status: SHIPPED | OUTPATIENT
Start: 2022-10-09 | End: 2022-10-09 | Stop reason: SDUPTHER

## 2022-10-09 RX ORDER — DEXAMETHASONE 6 MG/1
6 TABLET ORAL DAILY
Qty: 3 TABLET | Refills: 0 | Status: SHIPPED | OUTPATIENT
Start: 2022-10-09 | End: 2022-10-09 | Stop reason: SDUPTHER

## 2022-10-09 RX ORDER — DEXTROMETHORPHAN HYDROBROMIDE AND PROMETHAZINE HYDROCHLORIDE 15; 6.25 MG/5ML; MG/5ML
5 SYRUP ORAL EVERY 6 HOURS PRN
Qty: 120 ML | Refills: 0 | Status: SHIPPED | OUTPATIENT
Start: 2022-10-09 | End: 2023-06-07

## 2022-10-09 ASSESSMENT — ENCOUNTER SYMPTOMS
COUGH: 1
SORE THROAT: 1
FEVER: 1

## 2022-10-10 NOTE — PROGRESS NOTES
"Subjective     Devon Pozo is a 32 y.o. male who presents with Cough (X 1 week with dry scratchy throat, congestion, fever ( did not check).  Covid test Negative.  )      - This is a pleasant and nontoxic appearing 32 y.o. male who has come to the walk-in clinic today for:    #1) 1-2 wks w/ sinus pain/pressure and colorful nasal dc. Also some cough. No associated NVFC/cp/sob.       ALLERGIES:  Patient has no known allergies.     PMH:  Past Medical History:   Diagnosis Date    ADHD     Anxiety     Depression     History of palpitations     Insomnia         PSH:  Past Surgical History:   Procedure Laterality Date    OPEN REDUCTION      lefT clavicle    THYROGLOSSAL DUCT EXCISION         MEDS:    Current Outpatient Medications:     doxycycline (VIBRAMYCIN) 100 MG Tab, Take 1 Tablet by mouth every 12 hours for 7 days., Disp: 14 Tablet, Rfl: 0    albuterol 108 (90 Base) MCG/ACT Aero Soln inhalation aerosol, Inhale 2 Puffs every 6 hours as needed for Shortness of Breath., Disp: 8.5 g, Rfl: 3    dexamethasone (DECADRON) 6 MG Tab, Take 1 Tablet by mouth every day., Disp: 3 Tablet, Rfl: 0    promethazine-dextromethorphan (PROMETHAZINE-DM) 6.25-15 MG/5ML syrup, Take 5 mL by mouth every 6 hours as needed for Cough., Disp: 120 mL, Rfl: 0    ** I have documented what I find to be significant in regards to past medical, social, family and surgical history  in my HPI or under PMH/PSH/FH review section, otherwise it is noncontributory **           HPI    Review of Systems   Constitutional:  Positive for fever.   HENT:  Positive for congestion and sore throat.    Respiratory:  Positive for cough.    All other systems reviewed and are negative.           Objective     /76   Pulse 89   Temp 36.5 °C (97.7 °F) (Temporal)   Resp 14   Ht 1.854 m (6' 1\")   Wt 98.4 kg (217 lb)   SpO2 97%   BMI 28.63 kg/m²      Physical Exam  Vitals and nursing note reviewed.   Constitutional:       General: He is not in acute distress.     " Appearance: Normal appearance. He is well-developed.   HENT:      Head: Normocephalic.      Nose: Congestion and rhinorrhea present.      Mouth/Throat:      Mouth: Mucous membranes are moist.      Pharynx: Posterior oropharyngeal erythema present. No oropharyngeal exudate.   Cardiovascular:      Heart sounds: Normal heart sounds. No murmur heard.  Pulmonary:      Effort: Pulmonary effort is normal. No respiratory distress.      Breath sounds: Wheezing (faint exp wheeze) present.   Neurological:      Mental Status: He is alert.      Motor: No abnormal muscle tone.   Psychiatric:         Mood and Affect: Mood normal.         Behavior: Behavior normal.                           Assessment & Plan       1. Rhinosinusitis  doxycycline (VIBRAMYCIN) 100 MG Tab      2. Acute cough  albuterol 108 (90 Base) MCG/ACT Aero Soln inhalation aerosol    dexamethasone (DECADRON) 6 MG Tab    promethazine-dextromethorphan (PROMETHAZINE-DM) 6.25-15 MG/5ML syrup      3. Sore throat  POCT Rapid Strep A          - Dx, plan & d/c instructions discussed   - Rest, stay hydrated, OTC Flonase  - E.R. precautions discussed     Follow up with your regular primary care providers office for a recheck on today's visit. ER if not improving in 2-3 days or if feeling/getting worse. (If you do not have a primary care provider and need to schedule one you may call [facility] at [phone] to do this).    Any realistic side effects of medications that may have been given today reviewed.     Patient left in stable condition     POCT results reviewed/discussed

## 2023-06-07 ENCOUNTER — OFFICE VISIT (OUTPATIENT)
Dept: MEDICAL GROUP | Facility: PHYSICIAN GROUP | Age: 33
End: 2023-06-07
Payer: COMMERCIAL

## 2023-06-07 VITALS
HEART RATE: 49 BPM | DIASTOLIC BLOOD PRESSURE: 70 MMHG | BODY MASS INDEX: 27.17 KG/M2 | HEIGHT: 73 IN | SYSTOLIC BLOOD PRESSURE: 110 MMHG | OXYGEN SATURATION: 99 % | TEMPERATURE: 97.7 F | WEIGHT: 205 LBS

## 2023-06-07 DIAGNOSIS — Z00.00 WELLNESS EXAMINATION: ICD-10-CM

## 2023-06-07 DIAGNOSIS — F31.77 BIPOLAR DISORDER, IN PARTIAL REMISSION, MOST RECENT EPISODE MIXED (HCC): ICD-10-CM

## 2023-06-07 DIAGNOSIS — Z11.3 SCREENING EXAMINATION FOR SEXUALLY TRANSMITTED DISEASE: ICD-10-CM

## 2023-06-07 DIAGNOSIS — Z76.89 ENCOUNTER TO ESTABLISH CARE: ICD-10-CM

## 2023-06-07 PROCEDURE — 3078F DIAST BP <80 MM HG: CPT | Performed by: STUDENT IN AN ORGANIZED HEALTH CARE EDUCATION/TRAINING PROGRAM

## 2023-06-07 PROCEDURE — 3074F SYST BP LT 130 MM HG: CPT | Performed by: STUDENT IN AN ORGANIZED HEALTH CARE EDUCATION/TRAINING PROGRAM

## 2023-06-07 PROCEDURE — 99395 PREV VISIT EST AGE 18-39: CPT | Performed by: STUDENT IN AN ORGANIZED HEALTH CARE EDUCATION/TRAINING PROGRAM

## 2023-06-07 RX ORDER — GABAPENTIN 300 MG/1
CAPSULE ORAL
COMMUNITY
End: 2023-06-07

## 2023-06-07 RX ORDER — DICLOFENAC SODIUM 75 MG/1
TABLET, DELAYED RELEASE ORAL
COMMUNITY
End: 2023-06-07

## 2023-06-07 NOTE — PROGRESS NOTES
Subjective:     CC:  establish care    HISTORY OF THE PRESENT ILLNESS: Patient is a 32 y.o. male here today to establish care. Prior PCP was Lorena Cross.    Bipolar disorder, in partial remission, most recent episode mixed (HCC)  Patient saw psychiatry in the past but no longer follows up with them.  Patient reports he was on ADHD medication which worsened his manic episodes and then was on Lamictal for a couple months.  Patient admits to manic type episodes and seasonal depression but at this time does not feel that he needs medication.  Follows up with therapist weekly and just restarted a couple months ago.      Health Maintenance  Cholesterol Screening: ordered today   Diabetes Screening: ordered today   Diet / Exercise: BMI 27, cardio 1 hour daily  Smoking: former smoker, quit in 2013  Substance Abuse: denies   Safe in relationship: N/A   Dentist: had appointment yesterday  Ophthalmology: has not seen one, denies any issues    Infectious disease screening/Immunizations  --STI Screening: ordered today   --Practices safe sex.  --HIV Screening: ordered today   --Hepatitis C Screening: ordered today   --Immunizations:    Tetanus: UTD    Hepatitis B: unsure if he received the vaccines          No Known Allergies  Patient Active Problem List   Diagnosis    Attention deficit hyperactivity disorder (ADHD), predominantly inattentive type    Primary insomnia    Neck pain, chronic    Anxiety and depression    Bipolar disorder, in partial remission, most recent episode mixed (HCC)     No current outpatient medications on file.     No current facility-administered medications for this visit.     Past Surgical History:   Procedure Laterality Date    OPEN REDUCTION      left clavicle    REPAIR, KNEE, ACL Right     THYROGLOSSAL DUCT EXCISION        Social History     Socioeconomic History    Marital status:      Spouse name: Not on file    Number of children: 0    Years of education: Not on file    Highest  education level: Not on file   Occupational History    Occupation:      Employer: [employer]   Tobacco Use    Smoking status: Former     Packs/day: 0.25     Types: Cigarettes     Quit date: 2/8/2013     Years since quitting: 10.3    Smokeless tobacco: Former     Quit date: 2/8/2014   Vaping Use    Vaping Use: Some days    Substances: Nicotine, Flavoring    Devices: Disposable   Substance and Sexual Activity    Alcohol use: Yes     Alcohol/week: 1.8 oz     Types: 3 Cans of beer per week     Comment: weekends    Drug use: Yes     Types: Marijuana     Comment: rarely    Sexual activity: Yes     Partners: Female   Other Topics Concern    Not on file   Social History Narrative    Not on file     Social Determinants of Health     Financial Resource Strain: Not on file   Food Insecurity: Not on file   Transportation Needs: Not on file   Physical Activity: Not on file   Stress: Not on file   Social Connections: Not on file   Intimate Partner Violence: Not on file   Housing Stability: Not on file     Family History   Problem Relation Age of Onset    No Known Problems Mother     Hypertension Father     Psychiatric Illness Sister     Cancer Maternal Uncle         skin    Stroke Paternal Grandmother     Heart Disease Paternal Grandmother     Stroke Paternal Grandfather     Heart Disease Paternal Grandfather     Diabetes Other     Ovarian Cancer Neg Hx     Tubal Cancer Neg Hx     Peritoneal Cancer Neg Hx     Colorectal Cancer Neg Hx     Breast Cancer Neg Hx          ROS:     Constitutional:  Negative for chills, fever, fatigue, weight loss.  HEENT:  Negative for blurred vision, hearing loss, sore throat.    Respiratory:  Negative for cough, sputum production and shortness of breath.  Cardiovascular:  Negative for chest pain, palpitations and leg swelling.  Gastrointestinal:  Negative for abdominal pain, blood in stool, constipation, diarrhea and vomiting.   Musculoskeletal:  Negative for back pain, falls, joint  "pain and neck pain.   Skin:  Negative for rash.   Neurological:  Negative for dizziness, seizures, weakness and headaches.   Endo/Heme/Allergies:  Does not bruise/bleed easily.   Psychiatric/Behavioral:  Negative for depression, anxiety and suicidal thoughts.      Objective:     Exam: /70   Pulse (!) 49   Temp 36.5 °C (97.7 °F) (Temporal)   Ht 1.854 m (6' 1\")   Wt 93 kg (205 lb)   SpO2 99%  Body mass index is 27.05 kg/m².    Gen: Alert and oriented, no acute distress.  Eyes:  PERRL, conjunctivae clear, lids normal.   ENMT: Lips without lesions, good dentition, moist mucous membranes.  Neck: Neck is supple, trachea middle, no palpable lymphadenopathy or thyromegaly.  Lungs: Normal effort, CTAB, no wheezing / rhonchi / rales.  CV: RRR, normal S1 and S2, no murmurs.  GI:  Abdomen soft, non-tender, non-distended with normal bowel sounds and no palpable masses.  MSK:  Normal ROM.  Ext: No clubbing, cyanosis, or edema.  Skin:  Warm and dry with no rashes or lesions.  Neuro: AAO x 3, no acute focal deficits.  Psych: Normal affect and mood.      Assessment & Plan:   32 y.o. male with the following -    1. Encounter to establish care  2. Wellness examination  Presents today to establish care and annual preventive exam was done.  Chart was reviewed and history was discussed in detail with the patient.  Previous labs reviewed and annual labs ordered.  Patient is unsure of hepatitis B vaccine status and confirmatory lab ordered.  - CBC WITHOUT DIFFERENTIAL; Future  - Comp Metabolic Panel; Future  - Lipid Profile; Future  - HEP B SURFACE AB; Future    3. Screening examination for sexually transmitted disease  - HSV 1/2 IGG W/ TYPE SPECIFIC RFLX; Future  - Chlamydia/GC, PCR (Urine); Future  - HEP C VIRUS ANTIBODY; Future  - HIV AG/AB COMBO ASSAY SCREENING; Future  - T.PALLIDUM AB STUART (SCREENING); Future    4. Bipolar disorder, in partial remission, most recent episode mixed (HCC)  Chronic, stable.  At this time patient " does not wish to be on medication.  Continue to follow-up with therapist weekly.            Anticipatory guidance  --Discussed moderation in sodium/caffeine intake, saturated fat and cholesterol, caloric balance, sufficient fresh fruits/vegetables.  --Discussed brushing, flossing, and dental visits.   --Encouraged 150 minutes of exercise weekly.   --Discussed tobacco, alcohol, and other drug use.   --Discussed sexually transmitted infections, partner selection, use of condoms, avoidance of unintended pregnancy and contraceptive alternatives.  --Discussed safety belts, safety helmets, smoke detector, gun safety, etc.  --Discussed sun protection with minimum of spf 30.        Return in about 1 year (around 6/7/2024) for Annual preventive visit.    Please note that this dictation was created using voice recognition software. I have made every reasonable attempt to correct obvious errors, but I expect that there are errors of grammar and possibly content that I did not discover before finalizing the note.

## 2023-06-07 NOTE — ASSESSMENT & PLAN NOTE
Patient saw psychiatry in the past but no longer follows up with them.  Patient reports he was on ADHD medication which worsened his manic episodes and then was on Lamictal for a couple months.  Patient admits to manic type episodes and seasonal depression but at this time does not feel that he needs medication.  Follows up with therapist weekly and just restarted a couple months ago.

## 2023-06-08 ENCOUNTER — HOSPITAL ENCOUNTER (OUTPATIENT)
Dept: LAB | Facility: MEDICAL CENTER | Age: 33
End: 2023-06-08
Attending: STUDENT IN AN ORGANIZED HEALTH CARE EDUCATION/TRAINING PROGRAM
Payer: COMMERCIAL

## 2023-06-08 DIAGNOSIS — Z00.00 WELLNESS EXAMINATION: ICD-10-CM

## 2023-06-08 DIAGNOSIS — Z11.3 SCREENING EXAMINATION FOR SEXUALLY TRANSMITTED DISEASE: ICD-10-CM

## 2023-06-08 LAB
ALBUMIN SERPL BCP-MCNC: 4.6 G/DL (ref 3.2–4.9)
ALBUMIN/GLOB SERPL: 1.7 G/DL
ALP SERPL-CCNC: 68 U/L (ref 30–99)
ALT SERPL-CCNC: 17 U/L (ref 2–50)
ANION GAP SERPL CALC-SCNC: 11 MMOL/L (ref 7–16)
AST SERPL-CCNC: 19 U/L (ref 12–45)
BILIRUB SERPL-MCNC: 0.7 MG/DL (ref 0.1–1.5)
BUN SERPL-MCNC: 15 MG/DL (ref 8–22)
CALCIUM ALBUM COR SERPL-MCNC: 9.1 MG/DL (ref 8.5–10.5)
CALCIUM SERPL-MCNC: 9.6 MG/DL (ref 8.5–10.5)
CHLORIDE SERPL-SCNC: 105 MMOL/L (ref 96–112)
CHOLEST SERPL-MCNC: 199 MG/DL (ref 100–199)
CO2 SERPL-SCNC: 26 MMOL/L (ref 20–33)
CREAT SERPL-MCNC: 1.16 MG/DL (ref 0.5–1.4)
ERYTHROCYTE [DISTWIDTH] IN BLOOD BY AUTOMATED COUNT: 46.8 FL (ref 35.9–50)
FASTING STATUS PATIENT QL REPORTED: NORMAL
GFR SERPLBLD CREATININE-BSD FMLA CKD-EPI: 85 ML/MIN/1.73 M 2
GLOBULIN SER CALC-MCNC: 2.7 G/DL (ref 1.9–3.5)
GLUCOSE SERPL-MCNC: 99 MG/DL (ref 65–99)
HBV SURFACE AB SERPL IA-ACNC: 9.6 MIU/ML (ref 0–10)
HCT VFR BLD AUTO: 48.3 % (ref 42–52)
HCV AB SER QL: NORMAL
HDLC SERPL-MCNC: 55 MG/DL
HGB BLD-MCNC: 15.2 G/DL (ref 14–18)
HIV 1+2 AB+HIV1 P24 AG SERPL QL IA: NORMAL
LDLC SERPL CALC-MCNC: 132 MG/DL
MCH RBC QN AUTO: 31.3 PG (ref 27–33)
MCHC RBC AUTO-ENTMCNC: 31.5 G/DL (ref 32.3–36.5)
MCV RBC AUTO: 99.6 FL (ref 81.4–97.8)
PLATELET # BLD AUTO: 216 K/UL (ref 164–446)
PMV BLD AUTO: 10.5 FL (ref 9–12.9)
POTASSIUM SERPL-SCNC: 4.9 MMOL/L (ref 3.6–5.5)
PROT SERPL-MCNC: 7.3 G/DL (ref 6–8.2)
RBC # BLD AUTO: 4.85 M/UL (ref 4.7–6.1)
SODIUM SERPL-SCNC: 142 MMOL/L (ref 135–145)
T PALLIDUM AB SER QL IA: NORMAL
TRIGL SERPL-MCNC: 59 MG/DL (ref 0–149)
WBC # BLD AUTO: 4.1 K/UL (ref 4.8–10.8)

## 2023-06-08 PROCEDURE — 80053 COMPREHEN METABOLIC PANEL: CPT

## 2023-06-08 PROCEDURE — 86803 HEPATITIS C AB TEST: CPT

## 2023-06-08 PROCEDURE — 36415 COLL VENOUS BLD VENIPUNCTURE: CPT

## 2023-06-08 PROCEDURE — 87389 HIV-1 AG W/HIV-1&-2 AB AG IA: CPT

## 2023-06-08 PROCEDURE — 86694 HERPES SIMPLEX NES ANTBDY: CPT

## 2023-06-08 PROCEDURE — 86696 HERPES SIMPLEX TYPE 2 TEST: CPT

## 2023-06-08 PROCEDURE — 87591 N.GONORRHOEAE DNA AMP PROB: CPT

## 2023-06-08 PROCEDURE — 86706 HEP B SURFACE ANTIBODY: CPT

## 2023-06-08 PROCEDURE — 87491 CHLMYD TRACH DNA AMP PROBE: CPT

## 2023-06-08 PROCEDURE — 86695 HERPES SIMPLEX TYPE 1 TEST: CPT

## 2023-06-08 PROCEDURE — 80061 LIPID PANEL: CPT

## 2023-06-08 PROCEDURE — 85027 COMPLETE CBC AUTOMATED: CPT

## 2023-06-08 PROCEDURE — 86780 TREPONEMA PALLIDUM: CPT

## 2023-06-10 LAB — HSV1+2 IGG SER IA-ACNC: >22.4 IV

## 2023-06-11 LAB
HSV1 GG IGG SER-ACNC: 48.4 IV
HSV2 GG IGG SER-ACNC: 0.09 IV

## 2023-06-14 PROBLEM — Z86.19 HISTORY OF PCR DNA POSITIVE FOR HSV1: Status: ACTIVE | Noted: 2023-06-14

## 2023-11-16 DIAGNOSIS — Z00.00 ROUTINE HEALTH MAINTENANCE: ICD-10-CM

## 2023-11-16 DIAGNOSIS — Z11.3 SCREENING EXAMINATION FOR SEXUALLY TRANSMITTED DISEASE: ICD-10-CM

## 2023-12-09 ENCOUNTER — HOSPITAL ENCOUNTER (OUTPATIENT)
Dept: LAB | Facility: MEDICAL CENTER | Age: 33
End: 2023-12-09
Attending: STUDENT IN AN ORGANIZED HEALTH CARE EDUCATION/TRAINING PROGRAM
Payer: COMMERCIAL

## 2023-12-09 DIAGNOSIS — Z00.00 ROUTINE HEALTH MAINTENANCE: ICD-10-CM

## 2023-12-09 DIAGNOSIS — Z11.3 SCREENING EXAMINATION FOR SEXUALLY TRANSMITTED DISEASE: ICD-10-CM

## 2023-12-09 LAB
ALBUMIN SERPL BCP-MCNC: 4.6 G/DL (ref 3.2–4.9)
ALBUMIN/GLOB SERPL: 1.4 G/DL
ALP SERPL-CCNC: 72 U/L (ref 30–99)
ALT SERPL-CCNC: 59 U/L (ref 2–50)
ANION GAP SERPL CALC-SCNC: 10 MMOL/L (ref 7–16)
AST SERPL-CCNC: 26 U/L (ref 12–45)
BILIRUB SERPL-MCNC: 0.4 MG/DL (ref 0.1–1.5)
BUN SERPL-MCNC: 14 MG/DL (ref 8–22)
CALCIUM ALBUM COR SERPL-MCNC: 9.6 MG/DL (ref 8.5–10.5)
CALCIUM SERPL-MCNC: 10.1 MG/DL (ref 8.5–10.5)
CHLORIDE SERPL-SCNC: 105 MMOL/L (ref 96–112)
CO2 SERPL-SCNC: 28 MMOL/L (ref 20–33)
CREAT SERPL-MCNC: 0.9 MG/DL (ref 0.5–1.4)
ERYTHROCYTE [DISTWIDTH] IN BLOOD BY AUTOMATED COUNT: 41.8 FL (ref 35.9–50)
GFR SERPLBLD CREATININE-BSD FMLA CKD-EPI: 115 ML/MIN/1.73 M 2
GLOBULIN SER CALC-MCNC: 3.2 G/DL (ref 1.9–3.5)
GLUCOSE SERPL-MCNC: 101 MG/DL (ref 65–99)
HBV CORE AB SERPL QL IA: NONREACTIVE
HBV SURFACE AB SERPL IA-ACNC: 405 MIU/ML (ref 0–10)
HBV SURFACE AG SER QL: NORMAL
HCT VFR BLD AUTO: 48.9 % (ref 42–52)
HCV AB SER QL: NORMAL
HGB BLD-MCNC: 16.3 G/DL (ref 14–18)
HIV 1+2 AB+HIV1 P24 AG SERPL QL IA: NORMAL
MCH RBC QN AUTO: 31.7 PG (ref 27–33)
MCHC RBC AUTO-ENTMCNC: 33.3 G/DL (ref 32.3–36.5)
MCV RBC AUTO: 95 FL (ref 81.4–97.8)
PLATELET # BLD AUTO: 236 K/UL (ref 164–446)
PMV BLD AUTO: 10.1 FL (ref 9–12.9)
POTASSIUM SERPL-SCNC: 4.7 MMOL/L (ref 3.6–5.5)
PROT SERPL-MCNC: 7.8 G/DL (ref 6–8.2)
RBC # BLD AUTO: 5.15 M/UL (ref 4.7–6.1)
SODIUM SERPL-SCNC: 143 MMOL/L (ref 135–145)
T PALLIDUM AB SER QL IA: NORMAL
WBC # BLD AUTO: 4.9 K/UL (ref 4.8–10.8)

## 2023-12-09 PROCEDURE — 87591 N.GONORRHOEAE DNA AMP PROB: CPT

## 2023-12-09 PROCEDURE — 87491 CHLMYD TRACH DNA AMP PROBE: CPT

## 2023-12-09 PROCEDURE — 85027 COMPLETE CBC AUTOMATED: CPT

## 2023-12-09 PROCEDURE — 86780 TREPONEMA PALLIDUM: CPT

## 2023-12-09 PROCEDURE — 86803 HEPATITIS C AB TEST: CPT

## 2023-12-09 PROCEDURE — 87389 HIV-1 AG W/HIV-1&-2 AB AG IA: CPT

## 2023-12-09 PROCEDURE — 36415 COLL VENOUS BLD VENIPUNCTURE: CPT

## 2023-12-09 PROCEDURE — 86704 HEP B CORE ANTIBODY TOTAL: CPT

## 2023-12-09 PROCEDURE — 87340 HEPATITIS B SURFACE AG IA: CPT

## 2023-12-09 PROCEDURE — 80053 COMPREHEN METABOLIC PANEL: CPT

## 2023-12-09 PROCEDURE — 86706 HEP B SURFACE ANTIBODY: CPT

## 2024-04-12 ENCOUNTER — OFFICE VISIT (OUTPATIENT)
Dept: MEDICAL GROUP | Facility: PHYSICIAN GROUP | Age: 34
End: 2024-04-12
Payer: COMMERCIAL

## 2024-04-12 VITALS
HEIGHT: 73 IN | HEART RATE: 97 BPM | BODY MASS INDEX: 26.77 KG/M2 | DIASTOLIC BLOOD PRESSURE: 68 MMHG | SYSTOLIC BLOOD PRESSURE: 106 MMHG | WEIGHT: 202 LBS | OXYGEN SATURATION: 96 % | TEMPERATURE: 97.3 F

## 2024-04-12 DIAGNOSIS — S90.221A CONTUSION OF TOENAIL OF RIGHT FOOT, INITIAL ENCOUNTER: ICD-10-CM

## 2024-04-12 DIAGNOSIS — H00.012 HORDEOLUM EXTERNUM OF RIGHT LOWER EYELID: ICD-10-CM

## 2024-04-12 DIAGNOSIS — R74.8 ELEVATED LIVER ENZYMES: ICD-10-CM

## 2024-04-12 PROCEDURE — 3074F SYST BP LT 130 MM HG: CPT | Performed by: STUDENT IN AN ORGANIZED HEALTH CARE EDUCATION/TRAINING PROGRAM

## 2024-04-12 PROCEDURE — 3078F DIAST BP <80 MM HG: CPT | Performed by: STUDENT IN AN ORGANIZED HEALTH CARE EDUCATION/TRAINING PROGRAM

## 2024-04-12 PROCEDURE — 99213 OFFICE O/P EST LOW 20 MIN: CPT | Performed by: STUDENT IN AN ORGANIZED HEALTH CARE EDUCATION/TRAINING PROGRAM

## 2024-04-12 ASSESSMENT — FIBROSIS 4 INDEX: FIB4 SCORE: 0.49

## 2024-04-12 NOTE — PROGRESS NOTES
"Subjective:     Chief Complaint   Patient presents with    Follow-Up     ALT from CMP  Toenail looks deformed   Eye inflammation           HPI:   Tomas presents today to discuss labs.  Patient also complains of a right Jeremy on his right pinky toe and a red spot on his right lower eyelid.  Patient denies any associated pain or discharge from the eye.      Health Maintenance: Completed    ROS:  Negative except as stated above.      Objective:     Exam:  /68   Pulse 97   Temp 36.3 °C (97.3 °F)   Ht 1.854 m (6' 1\")   Wt 91.6 kg (202 lb)   SpO2 96%   BMI 26.65 kg/m²  Body mass index is 26.65 kg/m².    Physical Exam    Gen: Alert and oriented, no acute distress.  Eyes:  PERRL, conjunctivae clear, lids normal. Stye developing on outer corner of right lower lid.  Lungs: Normal effort, CTAB, no wheezing / rhonchi / rales.  CV: RRR, normal S1 and S2, no murmurs.  Skin:  Warm and dry with no rashes or lesions.  Small white lesion on right pinky toe but no signs of onychomycosis or other toenail deformities.    No visits with results within 1 Month(s) from this visit.   Latest known visit with results is:   Hospital Outpatient Visit on 12/09/2023   Component Date Value Ref Range Status    Sodium 12/09/2023 143  135 - 145 mmol/L Final    Potassium 12/09/2023 4.7  3.6 - 5.5 mmol/L Final    Chloride 12/09/2023 105  96 - 112 mmol/L Final    Co2 12/09/2023 28  20 - 33 mmol/L Final    Anion Gap 12/09/2023 10.0  7.0 - 16.0 Final    Glucose 12/09/2023 101 (H)  65 - 99 mg/dL Final    Bun 12/09/2023 14  8 - 22 mg/dL Final    Creatinine 12/09/2023 0.90  0.50 - 1.40 mg/dL Final    Calcium 12/09/2023 10.1  8.5 - 10.5 mg/dL Final    Correct Calcium 12/09/2023 9.6  8.5 - 10.5 mg/dL Final    AST(SGOT) 12/09/2023 26  12 - 45 U/L Final    ALT(SGPT) 12/09/2023 59 (H)  2 - 50 U/L Final    Alkaline Phosphatase 12/09/2023 72  30 - 99 U/L Final    Total Bilirubin 12/09/2023 0.4  0.1 - 1.5 mg/dL Final    Albumin 12/09/2023 4.6  3.2 - 4.9 " g/dL Final    Total Protein 12/09/2023 7.8  6.0 - 8.2 g/dL Final    Globulin 12/09/2023 3.2  1.9 - 3.5 g/dL Final    A-G Ratio 12/09/2023 1.4  g/dL Final    Hepatitis B Surface Antigen 12/09/2023 Non-Reactive  Non-Reactive Final    Comment: It has been reported that certain assays will not detect all HBV mutants.  If acute or chronic HBV infection is suspected and the HBsAg result is  negative, it is recommended that other serological markers be tested to  confirm the HBsAg nonreactivity.  HBsAg test results should always be  assessed in conjunction with the patient's medical history, clinical  examination, and other findings.    Note: Assay performance characteristics have not been established when the  [email or web address] HBsAg II assay is used in conjunction with other manufacturers'  assays for specific HBV serological markers. Assay performance  characteristics have not been established for testing of newborns.        Hepatitis B Core Ab, Total 12/09/2023 NonReactive  Non-Reactive Final    Comment: Antibodies to HBc were not detected.  The Roche HBc Total is a diagnostic test for the qualitative determination  of total antibodies to the hepatitis B core antigen in human serum and  plasma. The results should be used and interpreted only in the context of  the overall clinical picture. A negative test result does not exclude the  possibility of exposure to hepatitis B virus.  Note: Assay performance characteristics have not been established in  patients under the age of 21, pregnant women, or in populations of  immunocompromised or immunosuppressed patients.      Hep B Surface Antibody Quant 12/09/2023 405.00 (H)  0.00 - 10.00 mIU/mL Final    Comment: Anti HBs concentration detected at > 10 mIU/mL. Individual is considered to  be immune to infection with HBV.  Reference Interval: anti-HBs  < 8.5 mIU/mL..........Negative.  8.5 - 11.4 mIU/mL..........Indeterminate.  = 11.5 mIU/mL..........Positive.  Assay performance  characteristics have not been established when the [employer]  Anti HBs assay is used in conjunction with other manufacturers' assays for  specific HBV serological markers.  For post-vaccination antibody testing guidelines for the general public,  refer to MMWR December 23, 2005/Vol. 54 (No.16);1-23, and for healthcare  workers, refer to MMWR December 20, 2013/Vol.62(No. 10);1-19.      Hepatitis C Antibody 12/09/2023 Non-Reactive  Non-Reactive Final    Comment: Antibodies to HCV were not detected.  The Roche anti-HCV is a diagnostic test for the qualitative determination of  antibodies to the hepatitis C virus in human serum and plasma. The results  should be used and interpreted only in the context of the overall clinical  picture. A negative test result does not exclude the possibility of exposure  to hepatitis C virus.  Note: Assay performance characteristics have not been established in  populations of immunocompromised or immunosuppressed patients.      Syphilis, Treponemal Qual 12/09/2023 Non-Reactive  Non-Reactive Final    Comment: Result of Non-reactive indicates no serologic evidence of  infection with Treponema pallidum.  (Incubating or early  primary syphilis cannot be excluded).      HIV Ag/Ab Combo Assay 12/09/2023 Non-Reactive  Non Reactive Final    Comment: Roche HIV is a diagnostic test for the qualitative determination of HIV-1  p24 antigen and antibodies to HIV-1 (HIV-1 groups M and O) and HIV-2 in  human serum and plasma. A negative screen does not preclude the possibility  of exposure or infection. Consider retesting in high-risk patients, if  clinically indicated.      C. trachomatis by PCR 12/09/2023 Negative  Negative Final    Gc By Dna Probe 12/09/2023 Negative  Negative Final    Source 12/09/2023 Urine   Final    WBC 12/09/2023 4.9  4.8 - 10.8 K/uL Final    RBC 12/09/2023 5.15  4.70 - 6.10 M/uL Final    Hemoglobin 12/09/2023 16.3  14.0 - 18.0 g/dL Final    Hematocrit 12/09/2023 48.9  42.0 -  52.0 % Final    MCV 12/09/2023 95.0  81.4 - 97.8 fL Final    MCH 12/09/2023 31.7  27.0 - 33.0 pg Final    MCHC 12/09/2023 33.3  32.3 - 36.5 g/dL Final    Please note new reference range effective 05/22/2023.    RDW 12/09/2023 41.8  35.9 - 50.0 fL Final    Platelet Count 12/09/2023 236  164 - 446 K/uL Final    MPV 12/09/2023 10.1  9.0 - 12.9 fL Final    GFR (CKD-EPI) 12/09/2023 115  >60 mL/min/1.73 m 2 Final    Comment: Estimated Glomerular Filtration Rate is calculated using  race neutral CKD-EPI 2021 equation per NKF-ASN recommendations.             Assessment & Plan:     33 y.o. male with the following -     1. Elevated liver enzymes  This is a new problem.  ALT slightly elevated at 59 with otherwise normal LFTs.  Repeat LFTs ordered and if trending upward order RUQ ultrasound.  - HEPATIC FUNCTION PANEL; Future    2. Hordeolum externum of right lower eyelid  Acute.  No indication for antibiotic ointment at this time.  Apply warm compresses 3-4 times daily.    3. Contusion of toenail of right foot, initial encounter  Acute.  Most likely due to injury.  No sign of infection.          Return in about 9 months (around 1/12/2025) for Annual preventive visit, Discuss labs.    Please note that this dictation was created using voice recognition software. I have made every reasonable attempt to correct obvious errors, but I expect that there are errors of grammar and possibly content that I did not discover before finalizing the note.

## 2024-07-25 ENCOUNTER — OFFICE VISIT (OUTPATIENT)
Dept: INTERNAL MEDICINE | Facility: OTHER | Age: 34
End: 2024-07-25
Payer: COMMERCIAL

## 2024-07-25 VITALS
OXYGEN SATURATION: 97 % | BODY MASS INDEX: 26.29 KG/M2 | TEMPERATURE: 97.1 F | DIASTOLIC BLOOD PRESSURE: 70 MMHG | HEART RATE: 54 BPM | WEIGHT: 198.4 LBS | SYSTOLIC BLOOD PRESSURE: 110 MMHG | HEIGHT: 73 IN

## 2024-07-25 DIAGNOSIS — F90.0 ATTENTION DEFICIT HYPERACTIVITY DISORDER (ADHD), PREDOMINANTLY INATTENTIVE TYPE: ICD-10-CM

## 2024-07-25 DIAGNOSIS — M25.561 ARTHRALGIA OF BOTH KNEES: ICD-10-CM

## 2024-07-25 DIAGNOSIS — F32.A ANXIETY AND DEPRESSION: ICD-10-CM

## 2024-07-25 DIAGNOSIS — M25.562 ARTHRALGIA OF BOTH KNEES: ICD-10-CM

## 2024-07-25 DIAGNOSIS — F41.9 ANXIETY AND DEPRESSION: ICD-10-CM

## 2024-07-25 DIAGNOSIS — K52.9 GASTROENTERITIS: Primary | ICD-10-CM

## 2024-07-25 DIAGNOSIS — M43.17 SPONDYLOLISTHESIS OF LUMBOSACRAL REGION: ICD-10-CM

## 2024-07-25 DIAGNOSIS — F51.01 PRIMARY INSOMNIA: ICD-10-CM

## 2024-07-25 DIAGNOSIS — G89.29 CHRONIC BILATERAL LOW BACK PAIN WITHOUT SCIATICA: ICD-10-CM

## 2024-07-25 DIAGNOSIS — M54.50 CHRONIC BILATERAL LOW BACK PAIN WITHOUT SCIATICA: ICD-10-CM

## 2024-07-25 PROBLEM — M54.16 LUMBAR RADICULOPATHY: Status: ACTIVE | Noted: 2023-12-28

## 2024-07-25 PROBLEM — S83.519A RUPTURE OF ANTERIOR CRUCIATE LIGAMENT: Status: ACTIVE | Noted: 2022-12-05

## 2024-07-25 PROCEDURE — 99214 OFFICE O/P EST MOD 30 MIN: CPT | Mod: GC

## 2024-07-25 SDOH — ECONOMIC STABILITY: HOUSING INSECURITY: IN THE LAST 12 MONTHS, HOW MANY PLACES HAVE YOU LIVED?: 1

## 2024-07-25 SDOH — ECONOMIC STABILITY: HOUSING INSECURITY
IN THE LAST 12 MONTHS, WAS THERE A TIME WHEN YOU DID NOT HAVE A STEADY PLACE TO SLEEP OR SLEPT IN A SHELTER (INCLUDING NOW)?: NO

## 2024-07-25 SDOH — ECONOMIC STABILITY: FOOD INSECURITY: WITHIN THE PAST 12 MONTHS, YOU WORRIED THAT YOUR FOOD WOULD RUN OUT BEFORE YOU GOT MONEY TO BUY MORE.: NEVER TRUE

## 2024-07-25 SDOH — ECONOMIC STABILITY: FOOD INSECURITY: WITHIN THE PAST 12 MONTHS, THE FOOD YOU BOUGHT JUST DIDN'T LAST AND YOU DIDN'T HAVE MONEY TO GET MORE.: NEVER TRUE

## 2024-07-25 SDOH — ECONOMIC STABILITY: INCOME INSECURITY: IN THE LAST 12 MONTHS, WAS THERE A TIME WHEN YOU WERE NOT ABLE TO PAY THE MORTGAGE OR RENT ON TIME?: NO

## 2024-07-25 SDOH — HEALTH STABILITY: PHYSICAL HEALTH: ON AVERAGE, HOW MANY MINUTES DO YOU ENGAGE IN EXERCISE AT THIS LEVEL?: 120 MIN

## 2024-07-25 SDOH — HEALTH STABILITY: MENTAL HEALTH
STRESS IS WHEN SOMEONE FEELS TENSE, NERVOUS, ANXIOUS, OR CAN'T SLEEP AT NIGHT BECAUSE THEIR MIND IS TROUBLED. HOW STRESSED ARE YOU?: NOT AT ALL

## 2024-07-25 SDOH — HEALTH STABILITY: PHYSICAL HEALTH: ON AVERAGE, HOW MANY DAYS PER WEEK DO YOU ENGAGE IN MODERATE TO STRENUOUS EXERCISE (LIKE A BRISK WALK)?: 7 DAYS

## 2024-07-25 SDOH — ECONOMIC STABILITY: TRANSPORTATION INSECURITY
IN THE PAST 12 MONTHS, HAS THE LACK OF TRANSPORTATION KEPT YOU FROM MEDICAL APPOINTMENTS OR FROM GETTING MEDICATIONS?: NO

## 2024-07-25 SDOH — ECONOMIC STABILITY: INCOME INSECURITY: HOW HARD IS IT FOR YOU TO PAY FOR THE VERY BASICS LIKE FOOD, HOUSING, MEDICAL CARE, AND HEATING?: NOT HARD AT ALL

## 2024-07-25 SDOH — ECONOMIC STABILITY: TRANSPORTATION INSECURITY
IN THE PAST 12 MONTHS, HAS LACK OF TRANSPORTATION KEPT YOU FROM MEETINGS, WORK, OR FROM GETTING THINGS NEEDED FOR DAILY LIVING?: NO

## 2024-07-25 SDOH — ECONOMIC STABILITY: TRANSPORTATION INSECURITY
IN THE PAST 12 MONTHS, HAS LACK OF RELIABLE TRANSPORTATION KEPT YOU FROM MEDICAL APPOINTMENTS, MEETINGS, WORK OR FROM GETTING THINGS NEEDED FOR DAILY LIVING?: NO

## 2024-07-25 ASSESSMENT — SOCIAL DETERMINANTS OF HEALTH (SDOH)
DO YOU BELONG TO ANY CLUBS OR ORGANIZATIONS SUCH AS CHURCH GROUPS UNIONS, FRATERNAL OR ATHLETIC GROUPS, OR SCHOOL GROUPS?: NO
IN A TYPICAL WEEK, HOW MANY TIMES DO YOU TALK ON THE PHONE WITH FAMILY, FRIENDS, OR NEIGHBORS?: ONCE A WEEK
IN THE PAST 12 MONTHS, HAS THE ELECTRIC, GAS, OIL, OR WATER COMPANY THREATENED TO SHUT OFF SERVICE IN YOUR HOME?: NO
WITHIN THE PAST 12 MONTHS, YOU WORRIED THAT YOUR FOOD WOULD RUN OUT BEFORE YOU GOT THE MONEY TO BUY MORE: NEVER TRUE
HOW OFTEN DO YOU ATTEND CHURCH OR RELIGIOUS SERVICES?: NEVER
IN A TYPICAL WEEK, HOW MANY TIMES DO YOU TALK ON THE PHONE WITH FAMILY, FRIENDS, OR NEIGHBORS?: ONCE A WEEK
DO YOU BELONG TO ANY CLUBS OR ORGANIZATIONS SUCH AS CHURCH GROUPS UNIONS, FRATERNAL OR ATHLETIC GROUPS, OR SCHOOL GROUPS?: NO
HOW MANY DRINKS CONTAINING ALCOHOL DO YOU HAVE ON A TYPICAL DAY WHEN YOU ARE DRINKING: 1 OR 2
HOW OFTEN DO YOU GET TOGETHER WITH FRIENDS OR RELATIVES?: ONCE A WEEK
HOW OFTEN DO YOU ATTENT MEETINGS OF THE CLUB OR ORGANIZATION YOU BELONG TO?: NEVER
HOW HARD IS IT FOR YOU TO PAY FOR THE VERY BASICS LIKE FOOD, HOUSING, MEDICAL CARE, AND HEATING?: NOT HARD AT ALL
HOW OFTEN DO YOU GET TOGETHER WITH FRIENDS OR RELATIVES?: ONCE A WEEK
HOW OFTEN DO YOU HAVE SIX OR MORE DRINKS ON ONE OCCASION: LESS THAN MONTHLY
HOW OFTEN DO YOU ATTENT MEETINGS OF THE CLUB OR ORGANIZATION YOU BELONG TO?: NEVER
HOW OFTEN DO YOU HAVE A DRINK CONTAINING ALCOHOL: 2-3 TIMES A WEEK
HOW OFTEN DO YOU ATTEND CHURCH OR RELIGIOUS SERVICES?: NEVER

## 2024-07-25 ASSESSMENT — ENCOUNTER SYMPTOMS
ROS GI COMMENTS: SOFT STOOLS
BLURRED VISION: 0
FEVER: 0
WEAKNESS: 0
HEADACHES: 0
DIARRHEA: 0
VOMITING: 0
SORE THROAT: 0
NERVOUS/ANXIOUS: 0
BLOOD IN STOOL: 1
NAUSEA: 0
MYALGIAS: 0
COUGH: 0
ABDOMINAL PAIN: 0
BACK PAIN: 0
WEIGHT LOSS: 0
SHORTNESS OF BREATH: 0
DIZZINESS: 0
CONSTIPATION: 0
CHILLS: 0
DOUBLE VISION: 0
SPUTUM PRODUCTION: 0
PALPITATIONS: 0

## 2024-07-25 ASSESSMENT — LIFESTYLE VARIABLES
HOW OFTEN DO YOU HAVE A DRINK CONTAINING ALCOHOL: 2-3 TIMES A WEEK
AUDIT-C TOTAL SCORE: 4
SKIP TO QUESTIONS 9-10: 0
HOW OFTEN DO YOU HAVE SIX OR MORE DRINKS ON ONE OCCASION: LESS THAN MONTHLY
HOW MANY STANDARD DRINKS CONTAINING ALCOHOL DO YOU HAVE ON A TYPICAL DAY: 1 OR 2
SUBSTANCE_ABUSE: 0

## 2024-07-25 ASSESSMENT — FIBROSIS 4 INDEX: FIB4 SCORE: 0.5

## 2024-07-26 ENCOUNTER — HOSPITAL ENCOUNTER (OUTPATIENT)
Dept: LAB | Facility: MEDICAL CENTER | Age: 34
End: 2024-07-26
Payer: COMMERCIAL

## 2024-07-26 DIAGNOSIS — K52.9 GASTROENTERITIS: ICD-10-CM

## 2024-07-26 LAB
ADV 40+41 DNA STL QL NAA+NON-PROBE: NOT DETECTED
ALBUMIN SERPL BCP-MCNC: 4.4 G/DL (ref 3.2–4.9)
ALBUMIN/GLOB SERPL: 1.6 G/DL
ALP SERPL-CCNC: 82 U/L (ref 30–99)
ALT SERPL-CCNC: 31 U/L (ref 2–50)
ANION GAP SERPL CALC-SCNC: 10 MMOL/L (ref 7–16)
AST SERPL-CCNC: 33 U/L (ref 12–45)
ASTRO TYP 1-8 RNA STL QL NAA+NON-PROBE: NOT DETECTED
BASOPHILS # BLD AUTO: 0.8 % (ref 0–1.8)
BASOPHILS # BLD: 0.04 K/UL (ref 0–0.12)
BILIRUB SERPL-MCNC: 0.7 MG/DL (ref 0.1–1.5)
BUN SERPL-MCNC: 17 MG/DL (ref 8–22)
C CAYETANENSIS DNA STL QL NAA+NON-PROBE: NOT DETECTED
C COLI+JEJ+UPSA DNA STL QL NAA+NON-PROBE: NOT DETECTED
C PARVUM AG STL QL IA.RAPID: NEGATIVE
CALCIUM ALBUM COR SERPL-MCNC: 8.9 MG/DL (ref 8.5–10.5)
CALCIUM SERPL-MCNC: 9.2 MG/DL (ref 8.5–10.5)
CHLORIDE SERPL-SCNC: 103 MMOL/L (ref 96–112)
CO2 SERPL-SCNC: 26 MMOL/L (ref 20–33)
CREAT SERPL-MCNC: 1.11 MG/DL (ref 0.5–1.4)
CRYPTOSP DNA STL QL NAA+NON-PROBE: NOT DETECTED
E HISTOLYT DNA STL QL NAA+NON-PROBE: NOT DETECTED
EAEC PAA PLAS AGGR+AATA ST NAA+NON-PRB: NOT DETECTED
EC STX1+STX2 GENES STL QL NAA+NON-PROBE: NOT DETECTED
EOSINOPHIL # BLD AUTO: 0.14 K/UL (ref 0–0.51)
EOSINOPHIL NFR BLD: 2.8 % (ref 0–6.9)
EPEC EAE GENE STL QL NAA+NON-PROBE: NOT DETECTED
ERYTHROCYTE [DISTWIDTH] IN BLOOD BY AUTOMATED COUNT: 45.1 FL (ref 35.9–50)
ETEC LTA+ST1A+ST1B TOX ST NAA+NON-PROBE: NOT DETECTED
G LAMBLIA AG STL QL IA.RAPID: NEGATIVE
G LAMBLIA DNA STL QL NAA+NON-PROBE: DETECTED
GFR SERPLBLD CREATININE-BSD FMLA CKD-EPI: 89 ML/MIN/1.73 M 2
GLOBULIN SER CALC-MCNC: 2.7 G/DL (ref 1.9–3.5)
GLUCOSE SERPL-MCNC: 93 MG/DL (ref 65–99)
HCT VFR BLD AUTO: 48.3 % (ref 42–52)
HGB BLD-MCNC: 16.2 G/DL (ref 14–18)
IMM GRANULOCYTES # BLD AUTO: 0.01 K/UL (ref 0–0.11)
IMM GRANULOCYTES NFR BLD AUTO: 0.2 % (ref 0–0.9)
LACTOFERRIN STL QL IA: NEGATIVE
LYMPHOCYTES # BLD AUTO: 2.19 K/UL (ref 1–4.8)
LYMPHOCYTES NFR BLD: 43.8 % (ref 22–41)
MCH RBC QN AUTO: 32.2 PG (ref 27–33)
MCHC RBC AUTO-ENTMCNC: 33.5 G/DL (ref 32.3–36.5)
MCV RBC AUTO: 96 FL (ref 81.4–97.8)
MONOCYTES # BLD AUTO: 0.49 K/UL (ref 0–0.85)
MONOCYTES NFR BLD AUTO: 9.8 % (ref 0–13.4)
NEUTROPHILS # BLD AUTO: 2.13 K/UL (ref 1.82–7.42)
NEUTROPHILS NFR BLD: 42.6 % (ref 44–72)
NOROVIRUS GI+II RNA STL QL NAA+NON-PROBE: NOT DETECTED
NRBC # BLD AUTO: 0 K/UL
NRBC BLD-RTO: 0 /100 WBC (ref 0–0.2)
P SHIGELLOIDES DNA STL QL NAA+NON-PROBE: NOT DETECTED
PLATELET # BLD AUTO: 218 K/UL (ref 164–446)
PMV BLD AUTO: 10.2 FL (ref 9–12.9)
POTASSIUM SERPL-SCNC: 5.5 MMOL/L (ref 3.6–5.5)
PROT SERPL-MCNC: 7.1 G/DL (ref 6–8.2)
RBC # BLD AUTO: 5.03 M/UL (ref 4.7–6.1)
RVA RNA STL QL NAA+NON-PROBE: NOT DETECTED
S ENT+BONG DNA STL QL NAA+NON-PROBE: NOT DETECTED
SAPO I+II+IV+V RNA STL QL NAA+NON-PROBE: NOT DETECTED
SHIGELLA SP+EIEC IPAH ST NAA+NON-PROBE: NOT DETECTED
SODIUM SERPL-SCNC: 139 MMOL/L (ref 135–145)
V CHOL+PARA+VUL DNA STL QL NAA+NON-PROBE: NOT DETECTED
V CHOLERAE DNA STL QL NAA+NON-PROBE: NOT DETECTED
WBC # BLD AUTO: 5 K/UL (ref 4.8–10.8)
Y ENTEROCOL DNA STL QL NAA+NON-PROBE: NOT DETECTED

## 2024-07-26 PROCEDURE — 83630 LACTOFERRIN FECAL (QUAL): CPT

## 2024-07-26 PROCEDURE — 87046 STOOL CULTR AEROBIC BACT EA: CPT

## 2024-07-26 PROCEDURE — 36415 COLL VENOUS BLD VENIPUNCTURE: CPT

## 2024-07-26 PROCEDURE — 83993 ASSAY FOR CALPROTECTIN FECAL: CPT

## 2024-07-26 PROCEDURE — 87077 CULTURE AEROBIC IDENTIFY: CPT

## 2024-07-26 PROCEDURE — 87328 CRYPTOSPORIDIUM AG IA: CPT

## 2024-07-26 PROCEDURE — 87045 FECES CULTURE AEROBIC BACT: CPT

## 2024-07-26 PROCEDURE — 87899 AGENT NOS ASSAY W/OPTIC: CPT

## 2024-07-26 PROCEDURE — 87329 GIARDIA AG IA: CPT

## 2024-07-26 PROCEDURE — 87507 IADNA-DNA/RNA PROBE TQ 12-25: CPT

## 2024-07-26 PROCEDURE — 80053 COMPREHEN METABOLIC PANEL: CPT

## 2024-07-26 PROCEDURE — 85025 COMPLETE CBC W/AUTO DIFF WBC: CPT

## 2024-07-27 ENCOUNTER — TELEPHONE (OUTPATIENT)
Dept: INTERNAL MEDICINE | Facility: OTHER | Age: 34
End: 2024-07-27
Payer: COMMERCIAL

## 2024-07-27 LAB
E COLI SXT1+2 STL IA: NORMAL
SIGNIFICANT IND 70042: NORMAL
SITE SITE: NORMAL
SOURCE SOURCE: NORMAL

## 2024-07-27 RX ORDER — TINIDAZOLE 500 MG/1
2 TABLET ORAL ONCE
Qty: 1 TABLET | Refills: 0 | Status: SHIPPED | OUTPATIENT
Start: 2024-07-27 | End: 2024-07-27

## 2024-07-29 LAB
BACTERIA STL CULT: NORMAL
E COLI SXT1+2 STL IA: NORMAL
SIGNIFICANT IND 70042: NORMAL
SITE SITE: NORMAL
SOURCE SOURCE: NORMAL

## 2024-07-30 LAB — CALPROTECTIN STL-MCNT: 14 UG/G

## 2024-09-23 ENCOUNTER — HOSPITAL ENCOUNTER (OUTPATIENT)
Dept: RADIOLOGY | Facility: MEDICAL CENTER | Age: 34
End: 2024-09-23
Attending: NURSE PRACTITIONER
Payer: COMMERCIAL

## 2024-09-23 ENCOUNTER — OFFICE VISIT (OUTPATIENT)
Dept: URGENT CARE | Facility: PHYSICIAN GROUP | Age: 34
End: 2024-09-23
Payer: COMMERCIAL

## 2024-09-23 VITALS
HEIGHT: 73 IN | HEART RATE: 68 BPM | OXYGEN SATURATION: 98 % | WEIGHT: 198.6 LBS | TEMPERATURE: 98.2 F | DIASTOLIC BLOOD PRESSURE: 84 MMHG | BODY MASS INDEX: 26.32 KG/M2 | SYSTOLIC BLOOD PRESSURE: 104 MMHG | RESPIRATION RATE: 12 BRPM

## 2024-09-23 DIAGNOSIS — M25.512 ACUTE PAIN OF LEFT SHOULDER: ICD-10-CM
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
## 2024-09-23 DIAGNOSIS — S46.812A TRAPEZIUS STRAIN, LEFT, INITIAL ENCOUNTER: ICD-10-CM

## 2024-09-23 PROCEDURE — 73030 X-RAY EXAM OF SHOULDER: CPT | Mod: LT

## 2024-09-23 PROCEDURE — 3079F DIAST BP 80-89 MM HG: CPT | Performed by: NURSE PRACTITIONER

## 2024-09-23 PROCEDURE — 99213 OFFICE O/P EST LOW 20 MIN: CPT | Performed by: NURSE PRACTITIONER

## 2024-09-23 PROCEDURE — 3074F SYST BP LT 130 MM HG: CPT | Performed by: NURSE PRACTITIONER

## 2024-09-23 RX ORDER — CYCLOBENZAPRINE HCL 5 MG
5-10 TABLET ORAL 3 TIMES DAILY PRN
Qty: 20 TABLET | Refills: 0 | Status: SHIPPED | OUTPATIENT
Start: 2024-09-23

## 2024-09-23 ASSESSMENT — ENCOUNTER SYMPTOMS
BACK PAIN: 0
NECK PAIN: 0
ABDOMINAL PAIN: 0
SENSORY CHANGE: 0
HEADACHES: 0
DIZZINESS: 1
PALPITATIONS: 0

## 2024-09-23 ASSESSMENT — FIBROSIS 4 INDEX: FIB4 SCORE: 0.95

## 2024-09-24 PROBLEM — S52.132A: Status: ACTIVE | Noted: 2024-08-19

## 2024-09-24 RX ORDER — MELOXICAM 15 MG/1
1 TABLET ORAL
COMMUNITY

## 2024-09-24 ASSESSMENT — ENCOUNTER SYMPTOMS
TINGLING: 0
NUMBNESS: 0
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
## 2024-09-24 NOTE — PATIENT INSTRUCTIONS
-NSAID's (Meloxicam) and tylenol as directed for pain and inflammation.   -RICE Therapy: Rest, Ice    Follow up with PCP. Follow up emergently for neuro changes, vision changes, uncontrolled vomiting, syncope or near syncope, confusion, weakness, severe uncontrolled pain, neurovascular compromise (decreased sensation, motion, or circulation), persistent or severe headache, abdominal pain or swelling, elevated heart rate, persistent chest pain, shortness of breath.

## 2024-10-18 ENCOUNTER — TELEMEDICINE (OUTPATIENT)
Dept: INTERNAL MEDICINE | Facility: OTHER | Age: 34
End: 2024-10-18
Payer: COMMERCIAL

## 2024-10-18 VITALS — WEIGHT: 195 LBS | BODY MASS INDEX: 25.84 KG/M2 | HEIGHT: 73 IN

## 2024-10-18 DIAGNOSIS — Z11.3 ROUTINE SCREENING FOR STI (SEXUALLY TRANSMITTED INFECTION): Primary | ICD-10-CM

## 2024-10-18 PROBLEM — M54.12 CERVICAL RADICULOPATHY: Status: ACTIVE | Noted: 2023-03-05

## 2024-10-18 PROBLEM — S63.502A SPRAIN OF LEFT WRIST: Status: ACTIVE | Noted: 2024-08-19

## 2024-10-18 PROCEDURE — 99213 OFFICE O/P EST LOW 20 MIN: CPT | Mod: GT,GE

## 2024-10-18 ASSESSMENT — ENCOUNTER SYMPTOMS
DOUBLE VISION: 0
PALPITATIONS: 0
WEAKNESS: 0
CHILLS: 0
WEIGHT LOSS: 0
SORE THROAT: 0
MYALGIAS: 0
DIARRHEA: 0
BLURRED VISION: 0
FEVER: 0
NERVOUS/ANXIOUS: 0
ABDOMINAL PAIN: 0
COUGH: 0
VOMITING: 0
HEADACHES: 0
SPUTUM PRODUCTION: 0
DIZZINESS: 0
SHORTNESS OF BREATH: 0
BACK PAIN: 0
CONSTIPATION: 0
NAUSEA: 0

## 2024-10-18 ASSESSMENT — LIFESTYLE VARIABLES: SUBSTANCE_ABUSE: 0

## 2024-10-18 ASSESSMENT — FIBROSIS 4 INDEX: FIB4 SCORE: 0.95

## 2024-10-22 ENCOUNTER — HOSPITAL ENCOUNTER (OUTPATIENT)
Dept: LAB | Facility: MEDICAL CENTER | Age: 34
End: 2024-10-22
Payer: COMMERCIAL

## 2024-10-22 DIAGNOSIS — Z11.3 ROUTINE SCREENING FOR STI (SEXUALLY TRANSMITTED INFECTION): ICD-10-CM

## 2024-10-22 LAB
HBV CORE AB SERPL QL IA: NONREACTIVE
HBV SURFACE AB SERPL IA-ACNC: 180 MIU/ML (ref 0–10)
HBV SURFACE AG SER QL: NORMAL
HCV AB SER QL: NORMAL
HIV 1+2 AB+HIV1 P24 AG SERPL QL IA: NORMAL
T PALLIDUM AB SER QL IA: NORMAL

## 2024-10-22 PROCEDURE — 86803 HEPATITIS C AB TEST: CPT

## 2024-10-22 PROCEDURE — 87591 N.GONORRHOEAE DNA AMP PROB: CPT

## 2024-10-22 PROCEDURE — 36415 COLL VENOUS BLD VENIPUNCTURE: CPT

## 2024-10-22 PROCEDURE — 86780 TREPONEMA PALLIDUM: CPT

## 2024-10-22 PROCEDURE — 86706 HEP B SURFACE ANTIBODY: CPT

## 2024-10-22 PROCEDURE — 86704 HEP B CORE ANTIBODY TOTAL: CPT

## 2024-10-22 PROCEDURE — 87389 HIV-1 AG W/HIV-1&-2 AB AG IA: CPT

## 2024-10-22 PROCEDURE — 87340 HEPATITIS B SURFACE AG IA: CPT

## 2024-10-22 PROCEDURE — 87491 CHLMYD TRACH DNA AMP PROBE: CPT

## 2025-08-15 ENCOUNTER — OFFICE VISIT (OUTPATIENT)
Dept: URGENT CARE | Facility: CLINIC | Age: 35
End: 2025-08-15
Payer: COMMERCIAL

## 2025-08-15 VITALS
WEIGHT: 204 LBS | DIASTOLIC BLOOD PRESSURE: 78 MMHG | RESPIRATION RATE: 12 BRPM | SYSTOLIC BLOOD PRESSURE: 108 MMHG | HEART RATE: 70 BPM | HEIGHT: 73 IN | BODY MASS INDEX: 27.04 KG/M2 | TEMPERATURE: 97.5 F | OXYGEN SATURATION: 94 %

## 2025-08-15 DIAGNOSIS — R05.1 ACUTE COUGH: ICD-10-CM

## 2025-08-15 DIAGNOSIS — J32.9 RHINOSINUSITIS: Primary | ICD-10-CM

## 2025-08-15 PROCEDURE — 99213 OFFICE O/P EST LOW 20 MIN: CPT | Performed by: FAMILY MEDICINE

## 2025-08-15 PROCEDURE — 3078F DIAST BP <80 MM HG: CPT | Performed by: FAMILY MEDICINE

## 2025-08-15 PROCEDURE — 3074F SYST BP LT 130 MM HG: CPT | Performed by: FAMILY MEDICINE

## 2025-08-15 RX ORDER — DEXTROMETHORPHAN HYDROBROMIDE AND PROMETHAZINE HYDROCHLORIDE 15; 6.25 MG/5ML; MG/5ML
5 SYRUP ORAL 4 TIMES DAILY PRN
Qty: 120 ML | Refills: 0 | Status: SHIPPED | OUTPATIENT
Start: 2025-08-15

## 2025-08-15 ASSESSMENT — LIFESTYLE VARIABLES: HOW OFTEN DO YOU HAVE A DRINK CONTAINING ALCOHOL: NEVER

## 2025-08-15 ASSESSMENT — FIBROSIS 4 INDEX: FIB4 SCORE: 0.98

## 2025-08-16 ASSESSMENT — ENCOUNTER SYMPTOMS
EYE REDNESS: 0
NAUSEA: 0
MYALGIAS: 0
VOMITING: 0
WEIGHT LOSS: 0
EYE DISCHARGE: 0